# Patient Record
Sex: MALE | Race: BLACK OR AFRICAN AMERICAN | NOT HISPANIC OR LATINO | Employment: UNEMPLOYED | URBAN - METROPOLITAN AREA
[De-identification: names, ages, dates, MRNs, and addresses within clinical notes are randomized per-mention and may not be internally consistent; named-entity substitution may affect disease eponyms.]

---

## 2017-07-10 ENCOUNTER — HOSPITAL ENCOUNTER (OUTPATIENT)
Dept: RADIOLOGY | Facility: HOSPITAL | Age: 32
Discharge: HOME | End: 2017-07-10
Attending: INTERNAL MEDICINE
Payer: MEDICAID

## 2017-07-10 DIAGNOSIS — R10.9 ABDOMINAL PAIN: ICD-10-CM

## 2017-07-10 DIAGNOSIS — R10.9 ABDOMINAL PAIN: Primary | ICD-10-CM

## 2017-07-10 PROCEDURE — 74000 XR ABDOMEN AP 1 VIEW: CPT | Mod: 26,,, | Performed by: RADIOLOGY

## 2017-07-10 PROCEDURE — 74000 XR ABDOMEN AP 1 VIEW: CPT | Mod: TC

## 2017-07-13 ENCOUNTER — LAB VISIT (OUTPATIENT)
Dept: LAB | Facility: HOSPITAL | Age: 32
End: 2017-07-13
Attending: INTERNAL MEDICINE
Payer: MEDICAID

## 2017-07-13 DIAGNOSIS — R10.9 ABDOMINAL PAIN, UNSPECIFIED SITE: Primary | ICD-10-CM

## 2017-07-13 LAB
ALBUMIN SERPL BCP-MCNC: 3.1 G/DL
ALP SERPL-CCNC: 89 U/L
ALT SERPL W/O P-5'-P-CCNC: 8 U/L
ANION GAP SERPL CALC-SCNC: 6 MMOL/L
AST SERPL-CCNC: 13 U/L
BILIRUB SERPL-MCNC: 0.2 MG/DL
BUN SERPL-MCNC: 8 MG/DL
CALCIUM SERPL-MCNC: 9.3 MG/DL
CHLORIDE SERPL-SCNC: 102 MMOL/L
CO2 SERPL-SCNC: 31 MMOL/L
CORTIS SERPL-MCNC: 2.1 UG/DL
CREAT SERPL-MCNC: 0.9 MG/DL
EST. GFR  (AFRICAN AMERICAN): >60 ML/MIN/1.73 M^2
EST. GFR  (NON AFRICAN AMERICAN): >60 ML/MIN/1.73 M^2
GLUCOSE SERPL-MCNC: 89 MG/DL
POTASSIUM SERPL-SCNC: 4.2 MMOL/L
PROT SERPL-MCNC: 8.1 G/DL
SODIUM SERPL-SCNC: 139 MMOL/L

## 2017-07-13 PROCEDURE — 36415 COLL VENOUS BLD VENIPUNCTURE: CPT

## 2017-07-13 PROCEDURE — 82533 TOTAL CORTISOL: CPT

## 2017-07-13 PROCEDURE — 80053 COMPREHEN METABOLIC PANEL: CPT

## 2017-07-14 DIAGNOSIS — R10.9 ABDOMINAL PAIN, UNSPECIFIED SITE: Primary | ICD-10-CM

## 2017-07-26 ENCOUNTER — HOSPITAL ENCOUNTER (OUTPATIENT)
Dept: RADIOLOGY | Facility: HOSPITAL | Age: 32
Discharge: HOME | End: 2017-07-26
Attending: INTERNAL MEDICINE
Payer: MEDICAID

## 2017-07-26 DIAGNOSIS — R10.9 ABDOMINAL PAIN, UNSPECIFIED SITE: ICD-10-CM

## 2017-07-26 PROCEDURE — 74177 CT ABD & PELVIS W/CONTRAST: CPT | Mod: TC

## 2017-07-26 PROCEDURE — 25500020 PHARM REV CODE 255: Performed by: INTERNAL MEDICINE

## 2017-07-26 PROCEDURE — 74177 CT ABD & PELVIS W/CONTRAST: CPT | Mod: 26,,, | Performed by: RADIOLOGY

## 2017-07-26 RX ADMIN — IOHEXOL 100 ML: 350 INJECTION, SOLUTION INTRAVENOUS at 11:07

## 2017-07-26 RX ADMIN — IOHEXOL 15 ML: 300 INJECTION, SOLUTION INTRAVENOUS at 11:07

## 2019-10-23 DIAGNOSIS — R07.89 ANTERIOR CHEST WALL PAIN: ICD-10-CM

## 2019-10-23 DIAGNOSIS — R07.2: Primary | ICD-10-CM

## 2019-10-23 DIAGNOSIS — R07.2 PRECORDIAL PAIN: ICD-10-CM

## 2019-10-24 ENCOUNTER — HOSPITAL ENCOUNTER (OUTPATIENT)
Dept: RADIOLOGY | Facility: HOSPITAL | Age: 34
Discharge: HOME | End: 2019-10-24
Attending: INTERNAL MEDICINE
Payer: MEDICAID

## 2019-10-24 DIAGNOSIS — R07.2 PRECORDIAL PAIN: ICD-10-CM

## 2019-10-24 PROCEDURE — 71120 XR STERNUM PA AND LATERAL: ICD-10-PCS | Mod: 26,,, | Performed by: RADIOLOGY

## 2019-10-24 PROCEDURE — 71120 X-RAY EXAM BREASTBONE 2/>VWS: CPT | Mod: TC,FY

## 2019-10-24 PROCEDURE — 71120 X-RAY EXAM BREASTBONE 2/>VWS: CPT | Mod: 26,,, | Performed by: RADIOLOGY

## 2019-11-26 ENCOUNTER — HOSPITAL ENCOUNTER (OUTPATIENT)
Dept: RADIOLOGY | Facility: HOSPITAL | Age: 34
Discharge: HOME | End: 2019-11-26
Attending: INTERNAL MEDICINE
Payer: MEDICAID

## 2019-11-26 DIAGNOSIS — R07.89 ANTERIOR CHEST WALL PAIN: ICD-10-CM

## 2019-11-26 PROCEDURE — 92610 EVALUATE SWALLOWING FUNCTION: CPT

## 2019-11-26 PROCEDURE — G8996 SWALLOW CURRENT STATUS: HCPCS | Mod: CH

## 2019-11-26 PROCEDURE — 92611 MOTION FLUOROSCOPY/SWALLOW: CPT

## 2019-11-26 PROCEDURE — G8997 SWALLOW GOAL STATUS: HCPCS | Mod: CH

## 2019-11-26 PROCEDURE — 74230 X-RAY XM SWLNG FUNCJ C+: CPT | Mod: TC

## 2019-11-26 PROCEDURE — 74230 X-RAY XM SWLNG FUNCJ C+: CPT | Mod: 26,,, | Performed by: RADIOLOGY

## 2019-11-26 PROCEDURE — G8998 SWALLOW D/C STATUS: HCPCS | Mod: CH

## 2019-11-26 PROCEDURE — 74230 FL MODIFIED BARIUM SWALLOW SPEECH STUDY: ICD-10-PCS | Mod: 26,,, | Performed by: RADIOLOGY

## 2019-11-26 NOTE — PROCEDURES
Modified Barium Swallow    Patient Name:  Darshana Flor   MRN:  [id number]      Recommendations:     Recommendations:                General Recommendations:  Follow-up not indicated  Diet recommendations:   ,   Regular with thin liquids  Aspiration Precautions: none  General Precautions: Standard,    Communication strategies:  none    Referral     Reason for Referral  Patient was referred for a Modified Barium Swallow Study to assess the efficiency of his/her swallow function, rule out aspiration and make recommendations regarding safe dietary consistencies, effective compensatory strategies, and safe eating environment. Pt reporting no issues swallowing, occasional sensation of knot indicating mid sternum.     Diagnosis: dysphagia       History:     No past medical history on file.    Objective:     Current Respiratory Status:  room air    Alert: yes    Cooperative: yes    Follows Directions: yes    Visualization  · Patient was seen in the lateral view    Oral Peripheral Examination  ·  WFL    Consistencies Assessed  · Thin X1 spoon, X3 thin via straw  · Puree X1  · Solids X1    Oral Preparation/Oral Phase  · WFL- Pt with adequate bolus acceptance, containment, control and timely A-P transfer across consistencies     Pharyngeal Phase   WFL, swallow was timely, BOT retraction with resulting epiglottal inversion and laryngeal excursion were adequate for airway protection across trials and consistencies.     Cervical Esophageal Phase  · UES appeared to accommodate all bolus types without stasis or retrograde movement observed     Assessment:     Impressions  ·  Patient with oral and pharyngeal phases of swallowing deemed WNL    Plan  If complaints persist consider barium esophogram vs GI consult.     Education  Results were discussed with patient.      Time Tracking:   SLP Treatment Date:    11/26/19  Speech Start Time:   1040  Speech Stop Time:      1100  Speech Total Time (min):   20 minutes    Nikki Nazario  CCC-SLP  11/26/2019

## 2021-01-14 ENCOUNTER — TELEPHONE (OUTPATIENT)
Dept: RHEUMATOLOGY | Facility: CLINIC | Age: 36
End: 2021-01-14

## 2021-01-28 ENCOUNTER — TELEPHONE (OUTPATIENT)
Dept: RHEUMATOLOGY | Facility: CLINIC | Age: 36
End: 2021-01-28

## 2021-02-23 ENCOUNTER — TELEPHONE (OUTPATIENT)
Dept: RHEUMATOLOGY | Facility: CLINIC | Age: 36
End: 2021-02-23

## 2021-02-23 ENCOUNTER — LAB VISIT (OUTPATIENT)
Dept: LAB | Facility: HOSPITAL | Age: 36
End: 2021-02-23
Attending: INTERNAL MEDICINE
Payer: MEDICAID

## 2021-02-23 ENCOUNTER — OFFICE VISIT (OUTPATIENT)
Dept: RHEUMATOLOGY | Facility: CLINIC | Age: 36
End: 2021-02-23
Payer: MEDICAID

## 2021-02-23 VITALS
DIASTOLIC BLOOD PRESSURE: 77 MMHG | WEIGHT: 199.75 LBS | HEART RATE: 99 BPM | TEMPERATURE: 99 F | SYSTOLIC BLOOD PRESSURE: 131 MMHG

## 2021-02-23 DIAGNOSIS — R53.83 FATIGUE, UNSPECIFIED TYPE: ICD-10-CM

## 2021-02-23 DIAGNOSIS — M79.10 MYALGIA: ICD-10-CM

## 2021-02-23 DIAGNOSIS — R07.9 CHEST PAIN, UNSPECIFIED TYPE: ICD-10-CM

## 2021-02-23 DIAGNOSIS — D86.89 SARCOIDOSIS OF DIGESTIVE SYSTEM: ICD-10-CM

## 2021-02-23 DIAGNOSIS — M54.9 DORSALGIA, UNSPECIFIED: ICD-10-CM

## 2021-02-23 DIAGNOSIS — M25.559 HIP PAIN: ICD-10-CM

## 2021-02-23 DIAGNOSIS — D86.89 SARCOIDOSIS OF DIGESTIVE SYSTEM: Primary | ICD-10-CM

## 2021-02-23 LAB
ALBUMIN SERPL BCP-MCNC: 4 G/DL (ref 3.5–5.2)
ALP SERPL-CCNC: 138 U/L (ref 55–135)
ALT SERPL W/O P-5'-P-CCNC: 10 U/L (ref 10–44)
ANION GAP SERPL CALC-SCNC: 12 MMOL/L (ref 8–16)
AST SERPL-CCNC: 17 U/L (ref 10–40)
BASOPHILS # BLD AUTO: 0.07 K/UL (ref 0–0.2)
BASOPHILS NFR BLD: 0.9 % (ref 0–1.9)
BILIRUB SERPL-MCNC: 0.2 MG/DL (ref 0.1–1)
BUN SERPL-MCNC: 14 MG/DL (ref 6–20)
CALCIUM SERPL-MCNC: 9.9 MG/DL (ref 8.7–10.5)
CCP AB SER IA-ACNC: 1.3 U/ML
CHLORIDE SERPL-SCNC: 98 MMOL/L (ref 95–110)
CK SERPL-CCNC: 66 U/L (ref 20–200)
CO2 SERPL-SCNC: 30 MMOL/L (ref 23–29)
CREAT SERPL-MCNC: 0.9 MG/DL (ref 0.5–1.4)
CRP SERPL-MCNC: 68.4 MG/L (ref 0–8.2)
DIFFERENTIAL METHOD: ABNORMAL
EOSINOPHIL # BLD AUTO: 0.2 K/UL (ref 0–0.5)
EOSINOPHIL NFR BLD: 2.4 % (ref 0–8)
ERYTHROCYTE [DISTWIDTH] IN BLOOD BY AUTOMATED COUNT: 14 % (ref 11.5–14.5)
ERYTHROCYTE [SEDIMENTATION RATE] IN BLOOD BY WESTERGREN METHOD: 68 MM/HR (ref 0–23)
EST. GFR  (AFRICAN AMERICAN): >60 ML/MIN/1.73 M^2
EST. GFR  (NON AFRICAN AMERICAN): >60 ML/MIN/1.73 M^2
GLUCOSE SERPL-MCNC: 87 MG/DL (ref 70–110)
HCT VFR BLD AUTO: 43 % (ref 40–54)
HGB BLD-MCNC: 13.5 G/DL (ref 14–18)
IMM GRANULOCYTES # BLD AUTO: 0.02 K/UL (ref 0–0.04)
IMM GRANULOCYTES NFR BLD AUTO: 0.3 % (ref 0–0.5)
LYMPHOCYTES # BLD AUTO: 3.1 K/UL (ref 1–4.8)
LYMPHOCYTES NFR BLD: 40.9 % (ref 18–48)
MCH RBC QN AUTO: 27.1 PG (ref 27–31)
MCHC RBC AUTO-ENTMCNC: 31.4 G/DL (ref 32–36)
MCV RBC AUTO: 86 FL (ref 82–98)
MONOCYTES # BLD AUTO: 0.7 K/UL (ref 0.3–1)
MONOCYTES NFR BLD: 8.8 % (ref 4–15)
NEUTROPHILS # BLD AUTO: 3.6 K/UL (ref 1.8–7.7)
NEUTROPHILS NFR BLD: 46.7 % (ref 38–73)
NRBC BLD-RTO: 0 /100 WBC
PLATELET # BLD AUTO: 506 K/UL (ref 150–350)
PMV BLD AUTO: 10.7 FL (ref 9.2–12.9)
POTASSIUM SERPL-SCNC: 3.9 MMOL/L (ref 3.5–5.1)
PROT SERPL-MCNC: 8.9 G/DL (ref 6–8.4)
RBC # BLD AUTO: 4.98 M/UL (ref 4.6–6.2)
RHEUMATOID FACT SERPL-ACNC: <10 IU/ML (ref 0–15)
SODIUM SERPL-SCNC: 140 MMOL/L (ref 136–145)
WBC # BLD AUTO: 7.63 K/UL (ref 3.9–12.7)

## 2021-02-23 PROCEDURE — 86706 HEP B SURFACE ANTIBODY: CPT

## 2021-02-23 PROCEDURE — 86431 RHEUMATOID FACTOR QUANT: CPT

## 2021-02-23 PROCEDURE — 80053 COMPREHEN METABOLIC PANEL: CPT

## 2021-02-23 PROCEDURE — 86787 VARICELLA-ZOSTER ANTIBODY: CPT

## 2021-02-23 PROCEDURE — 86235 NUCLEAR ANTIGEN ANTIBODY: CPT

## 2021-02-23 PROCEDURE — 86703 HIV-1/HIV-2 1 RESULT ANTBDY: CPT

## 2021-02-23 PROCEDURE — 82164 ANGIOTENSIN I ENZYME TEST: CPT

## 2021-02-23 PROCEDURE — 85652 RBC SED RATE AUTOMATED: CPT

## 2021-02-23 PROCEDURE — 86140 C-REACTIVE PROTEIN: CPT

## 2021-02-23 PROCEDURE — 86200 CCP ANTIBODY: CPT

## 2021-02-23 PROCEDURE — 99205 PR OFFICE/OUTPT VISIT, NEW, LEVL V, 60-74 MIN: ICD-10-PCS | Mod: S$PBB,,, | Performed by: INTERNAL MEDICINE

## 2021-02-23 PROCEDURE — 87340 HEPATITIS B SURFACE AG IA: CPT

## 2021-02-23 PROCEDURE — 86803 HEPATITIS C AB TEST: CPT

## 2021-02-23 PROCEDURE — 86592 SYPHILIS TEST NON-TREP QUAL: CPT

## 2021-02-23 PROCEDURE — 86038 ANTINUCLEAR ANTIBODIES: CPT

## 2021-02-23 PROCEDURE — 86790 VIRUS ANTIBODY NOS: CPT

## 2021-02-23 PROCEDURE — 36415 COLL VENOUS BLD VENIPUNCTURE: CPT

## 2021-02-23 PROCEDURE — 85025 COMPLETE CBC W/AUTO DIFF WBC: CPT

## 2021-02-23 PROCEDURE — 82085 ASSAY OF ALDOLASE: CPT

## 2021-02-23 PROCEDURE — 99999 PR PBB SHADOW E&M-EST. PATIENT-LVL IV: CPT | Mod: PBBFAC,,, | Performed by: INTERNAL MEDICINE

## 2021-02-23 PROCEDURE — 81374 HLA I TYPING 1 ANTIGEN LR: CPT | Mod: PO

## 2021-02-23 PROCEDURE — 86682 HELMINTH ANTIBODY: CPT

## 2021-02-23 PROCEDURE — 99999 PR PBB SHADOW E&M-EST. PATIENT-LVL IV: ICD-10-PCS | Mod: PBBFAC,,, | Performed by: INTERNAL MEDICINE

## 2021-02-23 PROCEDURE — 82550 ASSAY OF CK (CPK): CPT

## 2021-02-23 PROCEDURE — 99214 OFFICE O/P EST MOD 30 MIN: CPT | Mod: PBBFAC | Performed by: INTERNAL MEDICINE

## 2021-02-23 PROCEDURE — 99205 OFFICE O/P NEW HI 60 MIN: CPT | Mod: S$PBB,,, | Performed by: INTERNAL MEDICINE

## 2021-02-23 PROCEDURE — 86704 HEP B CORE ANTIBODY TOTAL: CPT

## 2021-02-23 RX ORDER — FLUTICASONE PROPIONATE 50 MCG
SPRAY, SUSPENSION (ML) NASAL
COMMUNITY
End: 2024-03-11

## 2021-02-23 RX ORDER — LORATADINE 10 MG/1
TABLET ORAL
COMMUNITY
Start: 2021-01-04 | End: 2023-03-14

## 2021-02-23 RX ORDER — EPINEPHRINE 0.3 MG/.3ML
INJECTION SUBCUTANEOUS
COMMUNITY

## 2021-02-23 RX ORDER — DICYCLOMINE HYDROCHLORIDE 20 MG/1
20 TABLET ORAL 4 TIMES DAILY PRN
COMMUNITY
Start: 2021-01-04 | End: 2023-03-14

## 2021-02-23 RX ORDER — ERGOCALCIFEROL 1.25 MG/1
CAPSULE ORAL
COMMUNITY
End: 2023-03-14

## 2021-02-24 LAB
ALDOLASE SERPL-CCNC: 1.9 U/L (ref 1.2–7.6)
ANA SER QL IF: NORMAL
HBV CORE AB SERPL QL IA: NEGATIVE
HBV SURFACE AB SER-ACNC: POSITIVE M[IU]/ML
HBV SURFACE AG SERPL QL IA: NEGATIVE
HCV AB SERPL QL IA: NEGATIVE
HEPATITIS A ANTIBODY, IGG: NEGATIVE
HIV 1+2 AB+HIV1 P24 AG SERPL QL IA: NEGATIVE
RPR SER QL: NORMAL
VARICELLA INTERPRETATION: POSITIVE
VARICELLA ZOSTER IGG: 3.64 ISR (ref 0–0.9)

## 2021-02-25 ENCOUNTER — TELEPHONE (OUTPATIENT)
Dept: RHEUMATOLOGY | Facility: CLINIC | Age: 36
End: 2021-02-25

## 2021-02-25 LAB — ACE SERPL-CCNC: 19 U/L (ref 16–85)

## 2021-02-26 LAB
ANTI-SSA ANTIBODY: 0.08 RATIO (ref 0–0.99)
ANTI-SSA INTERPRETATION: NEGATIVE
STRONGYLOIDES ANTIBODY IGG: NEGATIVE

## 2021-03-01 ENCOUNTER — HOSPITAL ENCOUNTER (OUTPATIENT)
Dept: RADIOLOGY | Facility: HOSPITAL | Age: 36
Discharge: HOME | End: 2021-03-01
Attending: INTERNAL MEDICINE
Payer: MEDICAID

## 2021-03-01 DIAGNOSIS — R53.83 FATIGUE, UNSPECIFIED TYPE: ICD-10-CM

## 2021-03-01 DIAGNOSIS — M54.9 DORSALGIA, UNSPECIFIED: ICD-10-CM

## 2021-03-01 DIAGNOSIS — M25.559 HIP PAIN: ICD-10-CM

## 2021-03-01 DIAGNOSIS — R07.9 CHEST PAIN, UNSPECIFIED TYPE: ICD-10-CM

## 2021-03-01 DIAGNOSIS — D86.89 SARCOIDOSIS OF DIGESTIVE SYSTEM: ICD-10-CM

## 2021-03-01 DIAGNOSIS — M79.10 MYALGIA: ICD-10-CM

## 2021-03-01 PROCEDURE — 72100 XR LUMBAR SPINE AP AND LATERAL: ICD-10-PCS | Mod: 26,,, | Performed by: RADIOLOGY

## 2021-03-01 PROCEDURE — 72200 XR SACROILIAC JOINTS PA AND OBLIQUE: ICD-10-PCS | Mod: 26,,, | Performed by: RADIOLOGY

## 2021-03-01 PROCEDURE — 72040 X-RAY EXAM NECK SPINE 2-3 VW: CPT | Mod: TC

## 2021-03-01 PROCEDURE — 72040 XR CERVICAL SPINE AP LATERAL: ICD-10-PCS | Mod: 26,,, | Performed by: RADIOLOGY

## 2021-03-01 PROCEDURE — 72100 X-RAY EXAM L-S SPINE 2/3 VWS: CPT | Mod: 26,,, | Performed by: RADIOLOGY

## 2021-03-01 PROCEDURE — 72200 X-RAY EXAM SI JOINTS: CPT | Mod: TC

## 2021-03-01 PROCEDURE — 71046 X-RAY EXAM CHEST 2 VIEWS: CPT | Mod: 26,,, | Performed by: RADIOLOGY

## 2021-03-01 PROCEDURE — 72200 X-RAY EXAM SI JOINTS: CPT | Mod: 26,,, | Performed by: RADIOLOGY

## 2021-03-01 PROCEDURE — 72100 X-RAY EXAM L-S SPINE 2/3 VWS: CPT | Mod: TC

## 2021-03-01 PROCEDURE — 72040 X-RAY EXAM NECK SPINE 2-3 VW: CPT | Mod: 26,,, | Performed by: RADIOLOGY

## 2021-03-01 PROCEDURE — 71046 XR CHEST PA AND LATERAL: ICD-10-PCS | Mod: 26,,, | Performed by: RADIOLOGY

## 2021-03-01 PROCEDURE — 71046 X-RAY EXAM CHEST 2 VIEWS: CPT | Mod: TC

## 2021-03-02 ENCOUNTER — TELEPHONE (OUTPATIENT)
Dept: RHEUMATOLOGY | Facility: CLINIC | Age: 36
End: 2021-03-02

## 2021-03-02 DIAGNOSIS — D84.9 IMMUNOSUPPRESSION: ICD-10-CM

## 2021-03-02 DIAGNOSIS — M45.8 ANKYLOSING SPONDYLITIS OF SACRAL REGION: Primary | ICD-10-CM

## 2021-03-02 LAB
HLA B27 INTERPRETATION: NORMAL
HLA-B27 RELATED AG QL: NORMAL
HLA-B27 RELATED AG QL: POSITIVE

## 2021-03-02 RX ORDER — ADALIMUMAB 40MG/0.4ML
40 KIT SUBCUTANEOUS
Qty: 2 PEN | Refills: 2 | Status: SHIPPED | OUTPATIENT
Start: 2021-03-02 | End: 2021-05-10 | Stop reason: SDUPTHER

## 2021-03-03 ENCOUNTER — TELEPHONE (OUTPATIENT)
Dept: RHEUMATOLOGY | Facility: CLINIC | Age: 36
End: 2021-03-03

## 2021-03-04 ENCOUNTER — SPECIALTY PHARMACY (OUTPATIENT)
Dept: PHARMACY | Facility: CLINIC | Age: 36
End: 2021-03-04

## 2021-03-05 ENCOUNTER — IMMUNIZATION (OUTPATIENT)
Dept: PRIMARY CARE CLINIC | Facility: CLINIC | Age: 36
End: 2021-03-05
Payer: MEDICAID

## 2021-03-05 DIAGNOSIS — Z23 NEED FOR VACCINATION: Primary | ICD-10-CM

## 2021-03-05 PROCEDURE — 0031A PR IMMUNIZ ADMIN, SARS-COV-2 COVID-19 VACC, 5X10VP/0.5ML: ICD-10-PCS | Mod: CV19,S$GLB,, | Performed by: INTERNAL MEDICINE

## 2021-03-05 PROCEDURE — 91303 PR SARSCOV2 VAC AD26 .5ML IM: ICD-10-PCS | Mod: S$GLB,,, | Performed by: INTERNAL MEDICINE

## 2021-03-05 PROCEDURE — 91303 PR SARSCOV2 VAC AD26 .5ML IM: CPT | Mod: S$GLB,,, | Performed by: INTERNAL MEDICINE

## 2021-03-05 PROCEDURE — 0031A PR IMMUNIZ ADMIN, SARS-COV-2 COVID-19 VACC, 5X10VP/0.5ML: CPT | Mod: CV19,S$GLB,, | Performed by: INTERNAL MEDICINE

## 2021-03-09 ENCOUNTER — SPECIALTY PHARMACY (OUTPATIENT)
Dept: PHARMACY | Facility: CLINIC | Age: 36
End: 2021-03-09

## 2021-03-11 ENCOUNTER — CLINICAL SUPPORT (OUTPATIENT)
Dept: REHABILITATION | Facility: HOSPITAL | Age: 36
End: 2021-03-11
Payer: MEDICAID

## 2021-03-11 ENCOUNTER — OFFICE VISIT (OUTPATIENT)
Dept: INFECTIOUS DISEASES | Facility: CLINIC | Age: 36
End: 2021-03-11
Payer: MEDICAID

## 2021-03-11 VITALS
DIASTOLIC BLOOD PRESSURE: 74 MMHG | HEIGHT: 72 IN | BODY MASS INDEX: 26.25 KG/M2 | TEMPERATURE: 98 F | HEART RATE: 67 BPM | WEIGHT: 193.81 LBS | SYSTOLIC BLOOD PRESSURE: 111 MMHG

## 2021-03-11 DIAGNOSIS — M54.50 LUMBAR PAIN: ICD-10-CM

## 2021-03-11 DIAGNOSIS — R29.898 WEAKNESS OF BOTH LOWER EXTREMITIES: ICD-10-CM

## 2021-03-11 DIAGNOSIS — M25.551 HIP PAIN, BILATERAL: ICD-10-CM

## 2021-03-11 DIAGNOSIS — M25.552 HIP PAIN, BILATERAL: ICD-10-CM

## 2021-03-11 DIAGNOSIS — M53.86 DECREASED RANGE OF MOTION OF LUMBAR SPINE: ICD-10-CM

## 2021-03-11 DIAGNOSIS — D84.821 IMMUNOSUPPRESSION DUE TO DRUG THERAPY: Primary | ICD-10-CM

## 2021-03-11 DIAGNOSIS — Z79.899 IMMUNOSUPPRESSION DUE TO DRUG THERAPY: Primary | ICD-10-CM

## 2021-03-11 PROCEDURE — 97110 THERAPEUTIC EXERCISES: CPT | Mod: PO

## 2021-03-11 PROCEDURE — 99999 PR PBB SHADOW E&M-EST. PATIENT-LVL III: ICD-10-PCS | Mod: PBBFAC,,, | Performed by: INTERNAL MEDICINE

## 2021-03-11 PROCEDURE — 97161 PT EVAL LOW COMPLEX 20 MIN: CPT | Mod: PO

## 2021-03-11 PROCEDURE — 97140 MANUAL THERAPY 1/> REGIONS: CPT | Mod: PO

## 2021-03-11 PROCEDURE — 99213 OFFICE O/P EST LOW 20 MIN: CPT | Mod: PBBFAC | Performed by: INTERNAL MEDICINE

## 2021-03-11 PROCEDURE — 99204 PR OFFICE/OUTPT VISIT, NEW, LEVL IV, 45-59 MIN: ICD-10-PCS | Mod: S$PBB,,, | Performed by: INTERNAL MEDICINE

## 2021-03-11 PROCEDURE — 99204 OFFICE O/P NEW MOD 45 MIN: CPT | Mod: S$PBB,,, | Performed by: INTERNAL MEDICINE

## 2021-03-11 PROCEDURE — 99999 PR PBB SHADOW E&M-EST. PATIENT-LVL III: CPT | Mod: PBBFAC,,, | Performed by: INTERNAL MEDICINE

## 2021-03-11 RX ORDER — ACETAMINOPHEN AND CODEINE PHOSPHATE 300; 30 MG/1; MG/1
1 TABLET ORAL EVERY 6 HOURS PRN
COMMUNITY
Start: 2021-03-04 | End: 2023-03-14

## 2021-03-12 PROBLEM — M54.50 LUMBAR PAIN: Status: ACTIVE | Noted: 2021-03-12

## 2021-03-12 PROBLEM — R29.898 LOWER EXTREMITY WEAKNESS: Status: ACTIVE | Noted: 2021-03-12

## 2021-03-12 PROBLEM — M25.552 HIP PAIN, BILATERAL: Status: ACTIVE | Noted: 2021-03-12

## 2021-03-12 PROBLEM — M25.551 HIP PAIN, BILATERAL: Status: ACTIVE | Noted: 2021-03-12

## 2021-03-12 PROBLEM — M53.86 DECREASED RANGE OF MOTION OF LUMBAR SPINE: Status: ACTIVE | Noted: 2021-03-12

## 2021-03-14 ENCOUNTER — PATIENT MESSAGE (OUTPATIENT)
Dept: RHEUMATOLOGY | Facility: CLINIC | Age: 36
End: 2021-03-14

## 2021-03-17 ENCOUNTER — TELEPHONE (OUTPATIENT)
Dept: RHEUMATOLOGY | Facility: CLINIC | Age: 36
End: 2021-03-17

## 2021-03-26 ENCOUNTER — CLINICAL SUPPORT (OUTPATIENT)
Dept: INFECTIOUS DISEASES | Facility: CLINIC | Age: 36
End: 2021-03-26
Payer: MEDICAID

## 2021-03-26 DIAGNOSIS — Z79.899 IMMUNOSUPPRESSION DUE TO DRUG THERAPY: ICD-10-CM

## 2021-03-26 DIAGNOSIS — D84.821 IMMUNOSUPPRESSION DUE TO DRUG THERAPY: ICD-10-CM

## 2021-03-26 PROCEDURE — 90670 PCV13 VACCINE IM: CPT | Mod: PBBFAC

## 2021-03-26 PROCEDURE — 90471 IMMUNIZATION ADMIN: CPT | Mod: PBBFAC

## 2021-04-03 ENCOUNTER — SPECIALTY PHARMACY (OUTPATIENT)
Dept: PHARMACY | Facility: CLINIC | Age: 36
End: 2021-04-03

## 2021-04-12 ENCOUNTER — CLINICAL SUPPORT (OUTPATIENT)
Dept: REHABILITATION | Facility: HOSPITAL | Age: 36
End: 2021-04-12
Payer: MEDICAID

## 2021-04-12 DIAGNOSIS — M25.552 HIP PAIN, BILATERAL: ICD-10-CM

## 2021-04-12 DIAGNOSIS — R29.898 WEAKNESS OF BOTH LOWER EXTREMITIES: ICD-10-CM

## 2021-04-12 DIAGNOSIS — M54.50 LUMBAR PAIN: ICD-10-CM

## 2021-04-12 DIAGNOSIS — M25.551 HIP PAIN, BILATERAL: ICD-10-CM

## 2021-04-12 DIAGNOSIS — M53.86 DECREASED RANGE OF MOTION OF LUMBAR SPINE: ICD-10-CM

## 2021-04-12 PROCEDURE — 97110 THERAPEUTIC EXERCISES: CPT | Mod: PO

## 2021-04-12 PROCEDURE — 97140 MANUAL THERAPY 1/> REGIONS: CPT | Mod: PO

## 2021-04-19 ENCOUNTER — CLINICAL SUPPORT (OUTPATIENT)
Dept: REHABILITATION | Facility: HOSPITAL | Age: 36
End: 2021-04-19
Payer: MEDICAID

## 2021-04-19 DIAGNOSIS — M53.86 DECREASED RANGE OF MOTION OF LUMBAR SPINE: ICD-10-CM

## 2021-04-19 DIAGNOSIS — M25.552 HIP PAIN, BILATERAL: ICD-10-CM

## 2021-04-19 DIAGNOSIS — R29.898 WEAKNESS OF BOTH LOWER EXTREMITIES: ICD-10-CM

## 2021-04-19 DIAGNOSIS — M25.551 HIP PAIN, BILATERAL: ICD-10-CM

## 2021-04-19 DIAGNOSIS — M54.50 LUMBAR PAIN: ICD-10-CM

## 2021-04-19 PROCEDURE — 97140 MANUAL THERAPY 1/> REGIONS: CPT | Mod: PO

## 2021-04-19 PROCEDURE — 97110 THERAPEUTIC EXERCISES: CPT | Mod: PO

## 2021-04-21 ENCOUNTER — CLINICAL SUPPORT (OUTPATIENT)
Dept: REHABILITATION | Facility: HOSPITAL | Age: 36
End: 2021-04-21
Payer: MEDICAID

## 2021-04-21 DIAGNOSIS — R29.898 WEAKNESS OF BOTH LOWER EXTREMITIES: ICD-10-CM

## 2021-04-21 DIAGNOSIS — M25.552 HIP PAIN, BILATERAL: ICD-10-CM

## 2021-04-21 DIAGNOSIS — M53.86 DECREASED RANGE OF MOTION OF LUMBAR SPINE: ICD-10-CM

## 2021-04-21 DIAGNOSIS — M25.551 HIP PAIN, BILATERAL: ICD-10-CM

## 2021-04-21 DIAGNOSIS — M54.50 LUMBAR PAIN: ICD-10-CM

## 2021-04-21 PROCEDURE — 97110 THERAPEUTIC EXERCISES: CPT | Mod: PO

## 2021-04-21 PROCEDURE — 97140 MANUAL THERAPY 1/> REGIONS: CPT | Mod: PO

## 2021-04-26 ENCOUNTER — SPECIALTY PHARMACY (OUTPATIENT)
Dept: PHARMACY | Facility: CLINIC | Age: 36
End: 2021-04-26

## 2021-04-26 ENCOUNTER — CLINICAL SUPPORT (OUTPATIENT)
Dept: REHABILITATION | Facility: HOSPITAL | Age: 36
End: 2021-04-26
Payer: MEDICAID

## 2021-04-26 DIAGNOSIS — M25.552 HIP PAIN, BILATERAL: ICD-10-CM

## 2021-04-26 DIAGNOSIS — R29.898 WEAKNESS OF BOTH LOWER EXTREMITIES: ICD-10-CM

## 2021-04-26 DIAGNOSIS — M53.86 DECREASED RANGE OF MOTION OF LUMBAR SPINE: ICD-10-CM

## 2021-04-26 DIAGNOSIS — M25.551 HIP PAIN, BILATERAL: ICD-10-CM

## 2021-04-26 DIAGNOSIS — M54.50 LUMBAR PAIN: ICD-10-CM

## 2021-04-26 PROCEDURE — 97140 MANUAL THERAPY 1/> REGIONS: CPT | Mod: PO

## 2021-04-26 PROCEDURE — 97110 THERAPEUTIC EXERCISES: CPT | Mod: PO

## 2021-04-28 ENCOUNTER — CLINICAL SUPPORT (OUTPATIENT)
Dept: REHABILITATION | Facility: HOSPITAL | Age: 36
End: 2021-04-28
Payer: MEDICAID

## 2021-04-28 DIAGNOSIS — M54.50 LUMBAR PAIN: ICD-10-CM

## 2021-04-28 DIAGNOSIS — M25.551 HIP PAIN, BILATERAL: ICD-10-CM

## 2021-04-28 DIAGNOSIS — M53.86 DECREASED RANGE OF MOTION OF LUMBAR SPINE: ICD-10-CM

## 2021-04-28 DIAGNOSIS — R29.898 WEAKNESS OF BOTH LOWER EXTREMITIES: ICD-10-CM

## 2021-04-28 DIAGNOSIS — M25.552 HIP PAIN, BILATERAL: ICD-10-CM

## 2021-04-28 PROCEDURE — 97110 THERAPEUTIC EXERCISES: CPT | Mod: PO

## 2021-04-28 PROCEDURE — 97140 MANUAL THERAPY 1/> REGIONS: CPT | Mod: PO

## 2021-05-05 ENCOUNTER — CLINICAL SUPPORT (OUTPATIENT)
Dept: REHABILITATION | Facility: HOSPITAL | Age: 36
End: 2021-05-05
Payer: MEDICAID

## 2021-05-05 DIAGNOSIS — M25.551 HIP PAIN, BILATERAL: ICD-10-CM

## 2021-05-05 DIAGNOSIS — M25.552 HIP PAIN, BILATERAL: ICD-10-CM

## 2021-05-05 DIAGNOSIS — R29.898 WEAKNESS OF BOTH LOWER EXTREMITIES: ICD-10-CM

## 2021-05-05 DIAGNOSIS — M53.86 DECREASED RANGE OF MOTION OF LUMBAR SPINE: ICD-10-CM

## 2021-05-05 DIAGNOSIS — M54.50 LUMBAR PAIN: ICD-10-CM

## 2021-05-05 PROCEDURE — 97110 THERAPEUTIC EXERCISES: CPT | Mod: PO

## 2021-05-05 PROCEDURE — 97140 MANUAL THERAPY 1/> REGIONS: CPT | Mod: PO

## 2021-05-10 ENCOUNTER — OFFICE VISIT (OUTPATIENT)
Dept: RHEUMATOLOGY | Facility: CLINIC | Age: 36
End: 2021-05-10
Payer: MEDICAID

## 2021-05-10 VITALS
HEIGHT: 72 IN | SYSTOLIC BLOOD PRESSURE: 107 MMHG | DIASTOLIC BLOOD PRESSURE: 69 MMHG | WEIGHT: 195.56 LBS | HEART RATE: 87 BPM | BODY MASS INDEX: 26.49 KG/M2

## 2021-05-10 DIAGNOSIS — M45.8 ANKYLOSING SPONDYLITIS OF SACRAL REGION: Primary | ICD-10-CM

## 2021-05-10 DIAGNOSIS — M79.10 MYALGIA: ICD-10-CM

## 2021-05-10 DIAGNOSIS — D84.9 IMMUNOSUPPRESSION: ICD-10-CM

## 2021-05-10 DIAGNOSIS — R53.83 FATIGUE, UNSPECIFIED TYPE: ICD-10-CM

## 2021-05-10 DIAGNOSIS — D86.89 SARCOIDOSIS OF DIGESTIVE SYSTEM: ICD-10-CM

## 2021-05-10 PROCEDURE — 99999 PR PBB SHADOW E&M-EST. PATIENT-LVL III: CPT | Mod: PBBFAC,,, | Performed by: INTERNAL MEDICINE

## 2021-05-10 PROCEDURE — 99214 OFFICE O/P EST MOD 30 MIN: CPT | Mod: S$PBB,,, | Performed by: INTERNAL MEDICINE

## 2021-05-10 PROCEDURE — 99999 PR PBB SHADOW E&M-EST. PATIENT-LVL III: ICD-10-PCS | Mod: PBBFAC,,, | Performed by: INTERNAL MEDICINE

## 2021-05-10 PROCEDURE — 99213 OFFICE O/P EST LOW 20 MIN: CPT | Mod: PBBFAC | Performed by: INTERNAL MEDICINE

## 2021-05-10 PROCEDURE — 99214 PR OFFICE/OUTPT VISIT, EST, LEVL IV, 30-39 MIN: ICD-10-PCS | Mod: S$PBB,,, | Performed by: INTERNAL MEDICINE

## 2021-05-10 RX ORDER — TRIAMCINOLONE ACETONIDE 1 MG/G
CREAM TOPICAL
COMMUNITY
Start: 2021-04-06 | End: 2024-03-11

## 2021-05-10 RX ORDER — IBUPROFEN 100 MG/5ML
1000 SUSPENSION, ORAL (FINAL DOSE FORM) ORAL DAILY
COMMUNITY
End: 2023-03-14

## 2021-05-10 RX ORDER — ADALIMUMAB 40MG/0.4ML
40 KIT SUBCUTANEOUS
Qty: 2 PEN | Refills: 2 | Status: SHIPPED | OUTPATIENT
Start: 2021-05-10 | End: 2021-08-24 | Stop reason: SDUPTHER

## 2021-05-10 ASSESSMENT — ROUTINE ASSESSMENT OF PATIENT INDEX DATA (RAPID3)
PATIENT GLOBAL ASSESSMENT SCORE: 4
PAIN SCORE: 6
FATIGUE SCORE: 5
MDHAQ FUNCTION SCORE: 0.2
WHEN YOU AWAKENED IN THE MORNING OVER THE LAST WEEK, PLEASE INDICATE THE AMOUNT OF TIME IT TAKES UNTIL YOU ARE AS LIMBER AS YOU WILL BE FOR THE DAY: 4 HOURS
TOTAL RAPID3 SCORE: 3.56
AM STIFFNESS SCORE: 1, YES
PSYCHOLOGICAL DISTRESS SCORE: 6.6

## 2021-05-14 ENCOUNTER — PATIENT MESSAGE (OUTPATIENT)
Dept: RHEUMATOLOGY | Facility: CLINIC | Age: 36
End: 2021-05-14

## 2021-05-14 ENCOUNTER — LAB VISIT (OUTPATIENT)
Dept: LAB | Facility: HOSPITAL | Age: 36
End: 2021-05-14
Attending: INTERNAL MEDICINE
Payer: MEDICAID

## 2021-05-14 DIAGNOSIS — M45.8 ANKYLOSING SPONDYLITIS OF SACRAL REGION: ICD-10-CM

## 2021-05-14 DIAGNOSIS — D86.89 SARCOIDOSIS OF DIGESTIVE SYSTEM: ICD-10-CM

## 2021-05-14 DIAGNOSIS — D84.9 IMMUNOSUPPRESSION: ICD-10-CM

## 2021-05-14 DIAGNOSIS — R53.83 FATIGUE, UNSPECIFIED TYPE: ICD-10-CM

## 2021-05-14 DIAGNOSIS — M79.10 MYALGIA: ICD-10-CM

## 2021-05-14 LAB
ALBUMIN SERPL BCP-MCNC: 4.4 G/DL (ref 3.5–5.2)
ALP SERPL-CCNC: 93 U/L (ref 55–135)
ALT SERPL W/O P-5'-P-CCNC: 12 U/L (ref 10–44)
ANION GAP SERPL CALC-SCNC: 7 MMOL/L (ref 8–16)
AST SERPL-CCNC: 17 U/L (ref 10–40)
BASOPHILS # BLD AUTO: 0.08 K/UL (ref 0–0.2)
BASOPHILS NFR BLD: 1.4 % (ref 0–1.9)
BILIRUB SERPL-MCNC: 0.6 MG/DL (ref 0.1–1)
BUN SERPL-MCNC: 6 MG/DL (ref 6–20)
CALCIUM SERPL-MCNC: 9.9 MG/DL (ref 8.7–10.5)
CHLORIDE SERPL-SCNC: 103 MMOL/L (ref 95–110)
CK SERPL-CCNC: 121 U/L (ref 20–200)
CO2 SERPL-SCNC: 31 MMOL/L (ref 23–29)
CREAT SERPL-MCNC: 0.8 MG/DL (ref 0.5–1.4)
CRP SERPL-MCNC: 0.5 MG/L (ref 0–8.2)
DIFFERENTIAL METHOD: ABNORMAL
EOSINOPHIL # BLD AUTO: 0.6 K/UL (ref 0–0.5)
EOSINOPHIL NFR BLD: 9.4 % (ref 0–8)
ERYTHROCYTE [DISTWIDTH] IN BLOOD BY AUTOMATED COUNT: 15.5 % (ref 11.5–14.5)
ERYTHROCYTE [SEDIMENTATION RATE] IN BLOOD BY WESTERGREN METHOD: 14 MM/HR (ref 0–23)
EST. GFR  (AFRICAN AMERICAN): >60 ML/MIN/1.73 M^2
EST. GFR  (NON AFRICAN AMERICAN): >60 ML/MIN/1.73 M^2
GLUCOSE SERPL-MCNC: 74 MG/DL (ref 70–110)
HCT VFR BLD AUTO: 47.3 % (ref 40–54)
HGB BLD-MCNC: 15.6 G/DL (ref 14–18)
IMM GRANULOCYTES # BLD AUTO: 0 K/UL (ref 0–0.04)
IMM GRANULOCYTES NFR BLD AUTO: 0 % (ref 0–0.5)
LYMPHOCYTES # BLD AUTO: 4 K/UL (ref 1–4.8)
LYMPHOCYTES NFR BLD: 69 % (ref 18–48)
MCH RBC QN AUTO: 28.2 PG (ref 27–31)
MCHC RBC AUTO-ENTMCNC: 33 G/DL (ref 32–36)
MCV RBC AUTO: 86 FL (ref 82–98)
MONOCYTES # BLD AUTO: 0.6 K/UL (ref 0.3–1)
MONOCYTES NFR BLD: 9.4 % (ref 4–15)
NEUTROPHILS # BLD AUTO: 0.6 K/UL (ref 1.8–7.7)
NEUTROPHILS NFR BLD: 10.8 % (ref 38–73)
NRBC BLD-RTO: 0 /100 WBC
PLATELET # BLD AUTO: 307 K/UL (ref 150–450)
PMV BLD AUTO: 9.9 FL (ref 9.2–12.9)
POTASSIUM SERPL-SCNC: 3.8 MMOL/L (ref 3.5–5.1)
PROT SERPL-MCNC: 7.9 G/DL (ref 6–8.4)
RBC # BLD AUTO: 5.53 M/UL (ref 4.6–6.2)
SODIUM SERPL-SCNC: 141 MMOL/L (ref 136–145)
WBC # BLD AUTO: 5.83 K/UL (ref 3.9–12.7)

## 2021-05-14 PROCEDURE — 86140 C-REACTIVE PROTEIN: CPT | Performed by: INTERNAL MEDICINE

## 2021-05-14 PROCEDURE — 36415 COLL VENOUS BLD VENIPUNCTURE: CPT | Performed by: INTERNAL MEDICINE

## 2021-05-14 PROCEDURE — 80053 COMPREHEN METABOLIC PANEL: CPT | Performed by: INTERNAL MEDICINE

## 2021-05-14 PROCEDURE — 85652 RBC SED RATE AUTOMATED: CPT | Performed by: INTERNAL MEDICINE

## 2021-05-14 PROCEDURE — 82164 ANGIOTENSIN I ENZYME TEST: CPT | Performed by: INTERNAL MEDICINE

## 2021-05-14 PROCEDURE — 82550 ASSAY OF CK (CPK): CPT | Performed by: INTERNAL MEDICINE

## 2021-05-14 PROCEDURE — 85025 COMPLETE CBC W/AUTO DIFF WBC: CPT | Performed by: INTERNAL MEDICINE

## 2021-05-15 LAB — ACE SERPL-CCNC: 34 U/L (ref 16–85)

## 2021-05-25 ENCOUNTER — SPECIALTY PHARMACY (OUTPATIENT)
Dept: PHARMACY | Facility: CLINIC | Age: 36
End: 2021-05-25

## 2021-06-07 ENCOUNTER — TELEPHONE (OUTPATIENT)
Dept: RHEUMATOLOGY | Facility: CLINIC | Age: 36
End: 2021-06-07

## 2021-06-09 ENCOUNTER — TELEPHONE (OUTPATIENT)
Dept: RHEUMATOLOGY | Facility: CLINIC | Age: 36
End: 2021-06-09

## 2021-06-09 ENCOUNTER — PATIENT MESSAGE (OUTPATIENT)
Dept: RHEUMATOLOGY | Facility: CLINIC | Age: 36
End: 2021-06-09

## 2021-06-25 ENCOUNTER — DOCUMENTATION ONLY (OUTPATIENT)
Dept: REHABILITATION | Facility: HOSPITAL | Age: 36
End: 2021-06-25

## 2021-06-28 ENCOUNTER — SPECIALTY PHARMACY (OUTPATIENT)
Dept: PHARMACY | Facility: CLINIC | Age: 36
End: 2021-06-28

## 2021-07-19 ENCOUNTER — DOCUMENTATION ONLY (OUTPATIENT)
Dept: REHABILITATION | Facility: HOSPITAL | Age: 36
End: 2021-07-19

## 2021-07-21 ENCOUNTER — SPECIALTY PHARMACY (OUTPATIENT)
Dept: PHARMACY | Facility: CLINIC | Age: 36
End: 2021-07-21

## 2021-08-18 DIAGNOSIS — M45.8 ANKYLOSING SPONDYLITIS OF SACRAL REGION: ICD-10-CM

## 2021-08-18 RX ORDER — ADALIMUMAB 40MG/0.4ML
40 KIT SUBCUTANEOUS
Qty: 2 PEN | Refills: 2 | Status: CANCELLED | OUTPATIENT
Start: 2021-08-18 | End: 2021-11-10

## 2021-08-19 ENCOUNTER — PATIENT MESSAGE (OUTPATIENT)
Dept: PHARMACY | Facility: CLINIC | Age: 36
End: 2021-08-19

## 2021-08-23 ENCOUNTER — SPECIALTY PHARMACY (OUTPATIENT)
Dept: PHARMACY | Facility: CLINIC | Age: 36
End: 2021-08-23

## 2021-08-24 DIAGNOSIS — D86.89 SARCOIDOSIS OF DIGESTIVE SYSTEM: ICD-10-CM

## 2021-08-24 DIAGNOSIS — D84.9 IMMUNOSUPPRESSION: ICD-10-CM

## 2021-08-24 DIAGNOSIS — M45.8 ANKYLOSING SPONDYLITIS OF SACRAL REGION: Primary | ICD-10-CM

## 2021-08-24 DIAGNOSIS — M79.10 MYALGIA: ICD-10-CM

## 2021-08-24 DIAGNOSIS — R53.83 FATIGUE, UNSPECIFIED TYPE: ICD-10-CM

## 2021-08-25 RX ORDER — ADALIMUMAB 40MG/0.4ML
40 KIT SUBCUTANEOUS
Qty: 2 PEN | Refills: 2 | Status: SHIPPED | OUTPATIENT
Start: 2021-08-25 | End: 2021-09-03 | Stop reason: SDUPTHER

## 2021-08-26 DIAGNOSIS — M45.8 ANKYLOSING SPONDYLITIS OF SACRAL REGION: ICD-10-CM

## 2021-08-26 RX ORDER — ADALIMUMAB 40MG/0.4ML
40 KIT SUBCUTANEOUS
Qty: 2 PEN | Refills: 2 | OUTPATIENT
Start: 2021-08-26 | End: 2021-11-18

## 2021-09-03 ENCOUNTER — TELEPHONE (OUTPATIENT)
Dept: RHEUMATOLOGY | Facility: CLINIC | Age: 36
End: 2021-09-03

## 2021-09-03 DIAGNOSIS — M45.8 ANKYLOSING SPONDYLITIS OF SACRAL REGION: ICD-10-CM

## 2021-09-03 DIAGNOSIS — D84.9 IMMUNOSUPPRESSION: ICD-10-CM

## 2021-09-03 DIAGNOSIS — R53.83 FATIGUE, UNSPECIFIED TYPE: ICD-10-CM

## 2021-09-03 DIAGNOSIS — D86.89 SARCOIDOSIS OF DIGESTIVE SYSTEM: ICD-10-CM

## 2021-09-03 DIAGNOSIS — M79.10 MYALGIA: ICD-10-CM

## 2021-09-03 RX ORDER — ADALIMUMAB 40MG/0.4ML
40 KIT SUBCUTANEOUS
Qty: 2 PEN | Refills: 2 | Status: SHIPPED | OUTPATIENT
Start: 2021-09-03 | End: 2021-12-10 | Stop reason: SDUPTHER

## 2021-09-08 ENCOUNTER — PATIENT MESSAGE (OUTPATIENT)
Dept: PHARMACY | Facility: CLINIC | Age: 36
End: 2021-09-08

## 2021-09-30 ENCOUNTER — LAB VISIT (OUTPATIENT)
Dept: LAB | Facility: HOSPITAL | Age: 36
End: 2021-09-30
Attending: INTERNAL MEDICINE
Payer: MEDICAID

## 2021-09-30 ENCOUNTER — PATIENT MESSAGE (OUTPATIENT)
Dept: RHEUMATOLOGY | Facility: CLINIC | Age: 36
End: 2021-09-30

## 2021-09-30 ENCOUNTER — OFFICE VISIT (OUTPATIENT)
Dept: RHEUMATOLOGY | Facility: CLINIC | Age: 36
End: 2021-09-30
Payer: MEDICAID

## 2021-09-30 ENCOUNTER — TELEPHONE (OUTPATIENT)
Dept: RHEUMATOLOGY | Facility: CLINIC | Age: 36
End: 2021-09-30

## 2021-09-30 VITALS
BODY MASS INDEX: 28.07 KG/M2 | WEIGHT: 207.25 LBS | DIASTOLIC BLOOD PRESSURE: 71 MMHG | HEIGHT: 72 IN | SYSTOLIC BLOOD PRESSURE: 110 MMHG | HEART RATE: 64 BPM

## 2021-09-30 DIAGNOSIS — R53.83 FATIGUE, UNSPECIFIED TYPE: ICD-10-CM

## 2021-09-30 DIAGNOSIS — D84.9 IMMUNOSUPPRESSION: ICD-10-CM

## 2021-09-30 DIAGNOSIS — M45.8 ANKYLOSING SPONDYLITIS OF SACRAL REGION: Primary | ICD-10-CM

## 2021-09-30 DIAGNOSIS — M45.8 ANKYLOSING SPONDYLITIS OF SACRAL REGION: ICD-10-CM

## 2021-09-30 LAB
ALBUMIN SERPL BCP-MCNC: 4.3 G/DL (ref 3.5–5.2)
ALP SERPL-CCNC: 73 U/L (ref 55–135)
ALT SERPL W/O P-5'-P-CCNC: 21 U/L (ref 10–44)
ANION GAP SERPL CALC-SCNC: 8 MMOL/L (ref 8–16)
AST SERPL-CCNC: 20 U/L (ref 10–40)
BASOPHILS # BLD AUTO: 0.05 K/UL (ref 0–0.2)
BASOPHILS NFR BLD: 0.8 % (ref 0–1.9)
BILIRUB SERPL-MCNC: 0.5 MG/DL (ref 0.1–1)
BUN SERPL-MCNC: 10 MG/DL (ref 6–20)
CALCIUM SERPL-MCNC: 9.9 MG/DL (ref 8.7–10.5)
CHLORIDE SERPL-SCNC: 99 MMOL/L (ref 95–110)
CO2 SERPL-SCNC: 29 MMOL/L (ref 23–29)
CREAT SERPL-MCNC: 0.8 MG/DL (ref 0.5–1.4)
CRP SERPL-MCNC: 0.6 MG/L (ref 0–8.2)
DIFFERENTIAL METHOD: ABNORMAL
EOSINOPHIL # BLD AUTO: 0.4 K/UL (ref 0–0.5)
EOSINOPHIL NFR BLD: 7.3 % (ref 0–8)
ERYTHROCYTE [DISTWIDTH] IN BLOOD BY AUTOMATED COUNT: 14 % (ref 11.5–14.5)
ERYTHROCYTE [SEDIMENTATION RATE] IN BLOOD BY WESTERGREN METHOD: 4 MM/HR (ref 0–23)
EST. GFR  (AFRICAN AMERICAN): >60 ML/MIN/1.73 M^2
EST. GFR  (NON AFRICAN AMERICAN): >60 ML/MIN/1.73 M^2
GLUCOSE SERPL-MCNC: 77 MG/DL (ref 70–110)
HCT VFR BLD AUTO: 44.3 % (ref 40–54)
HGB BLD-MCNC: 14.4 G/DL (ref 14–18)
IMM GRANULOCYTES # BLD AUTO: 0.01 K/UL (ref 0–0.04)
IMM GRANULOCYTES NFR BLD AUTO: 0.2 % (ref 0–0.5)
LYMPHOCYTES # BLD AUTO: 3.4 K/UL (ref 1–4.8)
LYMPHOCYTES NFR BLD: 57 % (ref 18–48)
MCH RBC QN AUTO: 29.6 PG (ref 27–31)
MCHC RBC AUTO-ENTMCNC: 32.5 G/DL (ref 32–36)
MCV RBC AUTO: 91 FL (ref 82–98)
MONOCYTES # BLD AUTO: 0.5 K/UL (ref 0.3–1)
MONOCYTES NFR BLD: 8.7 % (ref 4–15)
NEUTROPHILS # BLD AUTO: 1.5 K/UL (ref 1.8–7.7)
NEUTROPHILS NFR BLD: 26 % (ref 38–73)
NRBC BLD-RTO: 0 /100 WBC
PLATELET # BLD AUTO: 277 K/UL (ref 150–450)
PMV BLD AUTO: 10.1 FL (ref 9.2–12.9)
POTASSIUM SERPL-SCNC: 4.3 MMOL/L (ref 3.5–5.1)
PROT SERPL-MCNC: 7.2 G/DL (ref 6–8.4)
RBC # BLD AUTO: 4.86 M/UL (ref 4.6–6.2)
SODIUM SERPL-SCNC: 136 MMOL/L (ref 136–145)
WBC # BLD AUTO: 5.89 K/UL (ref 3.9–12.7)

## 2021-09-30 PROCEDURE — 99999 PR PBB SHADOW E&M-EST. PATIENT-LVL III: CPT | Mod: PBBFAC,,, | Performed by: INTERNAL MEDICINE

## 2021-09-30 PROCEDURE — 36415 COLL VENOUS BLD VENIPUNCTURE: CPT | Performed by: INTERNAL MEDICINE

## 2021-09-30 PROCEDURE — 99214 OFFICE O/P EST MOD 30 MIN: CPT | Mod: S$PBB,,, | Performed by: INTERNAL MEDICINE

## 2021-09-30 PROCEDURE — 86140 C-REACTIVE PROTEIN: CPT | Performed by: INTERNAL MEDICINE

## 2021-09-30 PROCEDURE — 85652 RBC SED RATE AUTOMATED: CPT | Performed by: INTERNAL MEDICINE

## 2021-09-30 PROCEDURE — 85025 COMPLETE CBC W/AUTO DIFF WBC: CPT | Performed by: INTERNAL MEDICINE

## 2021-09-30 PROCEDURE — 80053 COMPREHEN METABOLIC PANEL: CPT | Performed by: INTERNAL MEDICINE

## 2021-09-30 PROCEDURE — 99214 PR OFFICE/OUTPT VISIT, EST, LEVL IV, 30-39 MIN: ICD-10-PCS | Mod: S$PBB,,, | Performed by: INTERNAL MEDICINE

## 2021-09-30 PROCEDURE — 99213 OFFICE O/P EST LOW 20 MIN: CPT | Mod: PBBFAC | Performed by: INTERNAL MEDICINE

## 2021-09-30 PROCEDURE — 99999 PR PBB SHADOW E&M-EST. PATIENT-LVL III: ICD-10-PCS | Mod: PBBFAC,,, | Performed by: INTERNAL MEDICINE

## 2021-09-30 RX ORDER — TERBINAFINE HYDROCHLORIDE 250 MG/1
250 TABLET ORAL DAILY
COMMUNITY
Start: 2021-06-30 | End: 2023-03-14

## 2021-09-30 RX ORDER — LEVOCETIRIZINE DIHYDROCHLORIDE 5 MG/1
TABLET, FILM COATED ORAL
COMMUNITY
Start: 2021-07-26 | End: 2023-03-14

## 2021-09-30 ASSESSMENT — ROUTINE ASSESSMENT OF PATIENT INDEX DATA (RAPID3)
PAIN SCORE: 10
TOTAL RAPID3 SCORE: 3.33
FATIGUE SCORE: 5
AM STIFFNESS SCORE: 1, YES
PSYCHOLOGICAL DISTRESS SCORE: 0
MDHAQ FUNCTION SCORE: 0
PATIENT GLOBAL ASSESSMENT SCORE: 0

## 2021-10-19 ENCOUNTER — SPECIALTY PHARMACY (OUTPATIENT)
Dept: PHARMACY | Facility: CLINIC | Age: 36
End: 2021-10-19

## 2021-11-11 ENCOUNTER — HOSPITAL ENCOUNTER (EMERGENCY)
Facility: HOSPITAL | Age: 36
Discharge: HOME | End: 2021-11-11
Attending: EMERGENCY MEDICINE
Payer: MEDICAID

## 2021-11-11 VITALS
TEMPERATURE: 98 F | OXYGEN SATURATION: 100 % | BODY MASS INDEX: 25.73 KG/M2 | SYSTOLIC BLOOD PRESSURE: 131 MMHG | WEIGHT: 190 LBS | RESPIRATION RATE: 18 BRPM | HEART RATE: 67 BPM | DIASTOLIC BLOOD PRESSURE: 84 MMHG | HEIGHT: 72 IN

## 2021-11-11 DIAGNOSIS — S61.213A LACERATION OF LEFT MIDDLE FINGER WITHOUT DAMAGE TO NAIL, FOREIGN BODY PRESENCE UNSPECIFIED, INITIAL ENCOUNTER: Primary | ICD-10-CM

## 2021-11-11 PROCEDURE — 63600175 PHARM REV CODE 636 W HCPCS

## 2021-11-11 PROCEDURE — 99284 PR EMERGENCY DEPT VISIT,LEVEL IV: ICD-10-PCS | Mod: ,,, | Performed by: EMERGENCY MEDICINE

## 2021-11-11 PROCEDURE — 96372 THER/PROPH/DIAG INJ SC/IM: CPT

## 2021-11-11 PROCEDURE — 99284 EMERGENCY DEPT VISIT MOD MDM: CPT | Mod: ,,, | Performed by: EMERGENCY MEDICINE

## 2021-11-11 PROCEDURE — 99284 EMERGENCY DEPT VISIT MOD MDM: CPT | Mod: 25

## 2021-11-11 RX ORDER — KETOROLAC TROMETHAMINE 30 MG/ML
15 INJECTION, SOLUTION INTRAMUSCULAR; INTRAVENOUS
Status: COMPLETED | OUTPATIENT
Start: 2021-11-11 | End: 2021-11-11

## 2021-11-11 RX ORDER — CEPHALEXIN 500 MG/1
500 CAPSULE ORAL 4 TIMES DAILY
Qty: 20 CAPSULE | Refills: 0 | Status: SHIPPED | OUTPATIENT
Start: 2021-11-11 | End: 2021-11-16

## 2021-11-11 RX ADMIN — KETOROLAC TROMETHAMINE 15 MG: 30 INJECTION, SOLUTION INTRAMUSCULAR at 07:11

## 2021-11-16 ENCOUNTER — SPECIALTY PHARMACY (OUTPATIENT)
Dept: PHARMACY | Facility: CLINIC | Age: 36
End: 2021-11-16
Payer: MEDICAID

## 2021-12-10 ENCOUNTER — SPECIALTY PHARMACY (OUTPATIENT)
Dept: PHARMACY | Facility: CLINIC | Age: 36
End: 2021-12-10
Payer: MEDICAID

## 2021-12-10 DIAGNOSIS — D84.9 IMMUNOSUPPRESSION: ICD-10-CM

## 2021-12-10 DIAGNOSIS — R53.83 FATIGUE, UNSPECIFIED TYPE: ICD-10-CM

## 2021-12-10 DIAGNOSIS — M79.10 MYALGIA: ICD-10-CM

## 2021-12-10 DIAGNOSIS — D86.89 SARCOIDOSIS OF DIGESTIVE SYSTEM: ICD-10-CM

## 2021-12-10 DIAGNOSIS — M45.8 ANKYLOSING SPONDYLITIS OF SACRAL REGION: ICD-10-CM

## 2021-12-13 RX ORDER — ADALIMUMAB 40MG/0.4ML
40 KIT SUBCUTANEOUS
Qty: 2 PEN | Refills: 2 | Status: SHIPPED | OUTPATIENT
Start: 2021-12-13 | End: 2022-03-03 | Stop reason: SDUPTHER

## 2021-12-15 ENCOUNTER — SPECIALTY PHARMACY (OUTPATIENT)
Dept: PHARMACY | Facility: CLINIC | Age: 36
End: 2021-12-15
Payer: MEDICAID

## 2022-01-06 ENCOUNTER — SPECIALTY PHARMACY (OUTPATIENT)
Dept: PHARMACY | Facility: CLINIC | Age: 37
End: 2022-01-06
Payer: MEDICAID

## 2022-01-07 ENCOUNTER — PATIENT MESSAGE (OUTPATIENT)
Dept: PHARMACY | Facility: CLINIC | Age: 37
End: 2022-01-07
Payer: MEDICAID

## 2022-01-10 NOTE — TELEPHONE ENCOUNTER
Specialty Pharmacy - Refill Coordination    Specialty Medication Orders Linked to Encounter    Flowsheet Row Most Recent Value   Medication #1 adalimumab (HUMIRA,CF, PEN) 40 mg/0.4 mL PnKt (Order#[id number], Rx#[id number])          Refill Questions - Documented Responses    Flowsheet Row Most Recent Value   Patient Availability and HIPAA Verification    Does patient want to proceed with activity? Yes   HIPAA/medical authority confirmed? Yes   Relationship to patient of person spoken to? Self   Refill Screening Questions    Changes to allergies? No   Changes to medications? No   New conditions since last clinic visit? No   Unplanned office visit, urgent care, ED, or hospital admission in the last 4 weeks? No   How does patient/caregiver feel medication is working? Good   Financial problems or insurance changes? No   How many doses of your specialty medications were missed in the last 4 weeks? 0   Would patient like to speak to a pharmacist? No   When does the patient need to receive the medication? 01/13/22   Refill Delivery Questions    How will the patient receive the medication? Pickup   When does the patient need to receive the medication? 01/13/22   Shipping Address Home   Address in [facility] confirmed and updated if neccessary? Yes   Expected Copay ($) 0   Is the patient able to afford the medication copay? Yes   Payment Method zero copay   Days supply of Refill 28   Supplies needed? No supplies needed   Refill activity completed? Yes   Refill activity plan Refill scheduled   Shipment/Pickup Date: 01/10/22          Current Outpatient Medications   Medication Sig    acetaminophen-codeine 300-30mg (TYLENOL #3) 300-30 mg Tab Take 1 tablet by mouth every 6 (six) hours as needed.    adalimumab (HUMIRA,CF, PEN) 40 mg/0.4 mL PnKt Inject 0.4 mLs (40 mg total) into the skin every 14 (fourteen) days.    ascorbic acid, vitamin C, (VITAMIN C) 1000 MG tablet Take 1,000 mg by mouth once daily.    dicyclomine  (BENTYL) 20 mg tablet Take 20 mg by mouth 4 (four) times daily as needed.    EPINEPHrine (EPIPEN) 0.3 mg/0.3 mL AtIn INJ 1 PEN SC PRN UTD    ergocalciferol (ERGOCALCIFEROL) 50,000 unit Cap Take 1 capsule every week by oral route.    fluticasone propionate (FLONASE) 50 mcg/actuation nasal spray SHAKE LQ AND U 1 SPR IEN BID    Lactobacillus rhamnosus GG (CULTURELLE) 10 billion cell capsule Take 1 capsule by mouth once daily.    levocetirizine (XYZAL) 5 MG tablet SMARTSI Tablet(s) By Mouth Every Evening    loratadine (CLARITIN) 10 mg tablet Take 1 tablet every day by oral route for 30 days.    multivitamin with minerals tablet Take 1 tablet by mouth once daily.    terbinafine HCL (LAMISIL) 250 mg tablet Take 250 mg by mouth once daily.    triamcinolone acetonide 0.1% (KENALOG) 0.1 % cream APPLY THIN LAYER TOPICALLY TO THE AFFECTED AREA TWICE DAILY   Last reviewed on 2021  7:09 AM by Shiva Peter RN    Review of patient's allergies indicates:   Allergen Reactions    Tramadol Itching    Celery (apium graveolens) (umbelliferae)     Chicken derived     Corn     Grass pollen- grass standard      TREE POLLEN    BRADEN GRASS     Milk      ALL DAIRY     Peanut      WALNUTS PECANS     Shrimp     Soy     Wheat     Last reviewed on  2021 6:49 AM by Odilia Camacho      Tasks added this encounter   No tasks added.   Tasks due within next 3 months   2022 - Refill Call (Auto Added)     Krish Macias, PharmD  [facility]  [address] [place] [id number]  Phone: [phone]  Fax: [phone]

## 2022-01-13 ENCOUNTER — OFFICE VISIT (OUTPATIENT)
Dept: RHEUMATOLOGY | Facility: CLINIC | Age: 37
End: 2022-01-13
Payer: MEDICAID

## 2022-01-13 ENCOUNTER — LAB VISIT (OUTPATIENT)
Dept: LAB | Facility: HOSPITAL | Age: 37
End: 2022-01-13
Attending: INTERNAL MEDICINE
Payer: MEDICAID

## 2022-01-13 VITALS
BODY MASS INDEX: 27 KG/M2 | WEIGHT: 199.31 LBS | HEIGHT: 72 IN | DIASTOLIC BLOOD PRESSURE: 74 MMHG | SYSTOLIC BLOOD PRESSURE: 121 MMHG | HEART RATE: 73 BPM

## 2022-01-13 DIAGNOSIS — R53.83 FATIGUE, UNSPECIFIED TYPE: ICD-10-CM

## 2022-01-13 DIAGNOSIS — D84.9 IMMUNOSUPPRESSION: ICD-10-CM

## 2022-01-13 DIAGNOSIS — M45.8 ANKYLOSING SPONDYLITIS OF SACRAL REGION: Primary | ICD-10-CM

## 2022-01-13 DIAGNOSIS — D86.89 SARCOIDOSIS OF DIGESTIVE SYSTEM: ICD-10-CM

## 2022-01-13 DIAGNOSIS — M45.8 ANKYLOSING SPONDYLITIS OF SACRAL REGION: ICD-10-CM

## 2022-01-13 LAB
ALBUMIN SERPL BCP-MCNC: 4.5 G/DL (ref 3.5–5.2)
ALP SERPL-CCNC: 76 U/L (ref 55–135)
ALT SERPL W/O P-5'-P-CCNC: 17 U/L (ref 10–44)
ANION GAP SERPL CALC-SCNC: 9 MMOL/L (ref 8–16)
AST SERPL-CCNC: 20 U/L (ref 10–40)
BASOPHILS # BLD AUTO: 0.07 K/UL (ref 0–0.2)
BASOPHILS NFR BLD: 1.3 % (ref 0–1.9)
BILIRUB SERPL-MCNC: 0.8 MG/DL (ref 0.1–1)
BUN SERPL-MCNC: 9 MG/DL (ref 6–20)
CALCIUM SERPL-MCNC: 9.8 MG/DL (ref 8.7–10.5)
CHLORIDE SERPL-SCNC: 103 MMOL/L (ref 95–110)
CO2 SERPL-SCNC: 28 MMOL/L (ref 23–29)
CREAT SERPL-MCNC: 0.7 MG/DL (ref 0.5–1.4)
CRP SERPL-MCNC: 0.6 MG/L (ref 0–8.2)
DIFFERENTIAL METHOD: ABNORMAL
EOSINOPHIL # BLD AUTO: 0.5 K/UL (ref 0–0.5)
EOSINOPHIL NFR BLD: 9.7 % (ref 0–8)
ERYTHROCYTE [DISTWIDTH] IN BLOOD BY AUTOMATED COUNT: 14.1 % (ref 11.5–14.5)
ERYTHROCYTE [SEDIMENTATION RATE] IN BLOOD BY WESTERGREN METHOD: 7 MM/HR (ref 0–23)
EST. GFR  (AFRICAN AMERICAN): >60 ML/MIN/1.73 M^2
EST. GFR  (NON AFRICAN AMERICAN): >60 ML/MIN/1.73 M^2
GLUCOSE SERPL-MCNC: 84 MG/DL (ref 70–110)
HBV CORE AB SERPL QL IA: NEGATIVE
HBV SURFACE AB SER-ACNC: POSITIVE M[IU]/ML
HBV SURFACE AG SERPL QL IA: NEGATIVE
HCT VFR BLD AUTO: 46.4 % (ref 40–54)
HCV AB SERPL QL IA: NEGATIVE
HGB BLD-MCNC: 15 G/DL (ref 14–18)
IMM GRANULOCYTES # BLD AUTO: 0.01 K/UL (ref 0–0.04)
IMM GRANULOCYTES NFR BLD AUTO: 0.2 % (ref 0–0.5)
LYMPHOCYTES # BLD AUTO: 3.2 K/UL (ref 1–4.8)
LYMPHOCYTES NFR BLD: 58 % (ref 18–48)
MCH RBC QN AUTO: 29.3 PG (ref 27–31)
MCHC RBC AUTO-ENTMCNC: 32.3 G/DL (ref 32–36)
MCV RBC AUTO: 91 FL (ref 82–98)
MONOCYTES # BLD AUTO: 0.5 K/UL (ref 0.3–1)
MONOCYTES NFR BLD: 9.9 % (ref 4–15)
NEUTROPHILS # BLD AUTO: 1.2 K/UL (ref 1.8–7.7)
NEUTROPHILS NFR BLD: 20.9 % (ref 38–73)
NRBC BLD-RTO: 0 /100 WBC
PLATELET # BLD AUTO: 333 K/UL (ref 150–450)
PMV BLD AUTO: 10.7 FL (ref 9.2–12.9)
POTASSIUM SERPL-SCNC: 4.5 MMOL/L (ref 3.5–5.1)
PROT SERPL-MCNC: 7.4 G/DL (ref 6–8.4)
RBC # BLD AUTO: 5.12 M/UL (ref 4.6–6.2)
SODIUM SERPL-SCNC: 140 MMOL/L (ref 136–145)
WBC # BLD AUTO: 5.48 K/UL (ref 3.9–12.7)

## 2022-01-13 PROCEDURE — 99214 OFFICE O/P EST MOD 30 MIN: CPT | Mod: S$PBB,,, | Performed by: INTERNAL MEDICINE

## 2022-01-13 PROCEDURE — 1160F PR REVIEW ALL MEDS BY PRESCRIBER/CLIN PHARMACIST DOCUMENTED: ICD-10-PCS | Mod: CPTII,,, | Performed by: INTERNAL MEDICINE

## 2022-01-13 PROCEDURE — 86140 C-REACTIVE PROTEIN: CPT | Performed by: INTERNAL MEDICINE

## 2022-01-13 PROCEDURE — 86704 HEP B CORE ANTIBODY TOTAL: CPT | Performed by: INTERNAL MEDICINE

## 2022-01-13 PROCEDURE — 99999 PR PBB SHADOW E&M-EST. PATIENT-LVL III: CPT | Mod: PBBFAC,,, | Performed by: INTERNAL MEDICINE

## 2022-01-13 PROCEDURE — 3078F PR MOST RECENT DIASTOLIC BLOOD PRESSURE < 80 MM HG: ICD-10-PCS | Mod: CPTII,,, | Performed by: INTERNAL MEDICINE

## 2022-01-13 PROCEDURE — 85025 COMPLETE CBC W/AUTO DIFF WBC: CPT | Performed by: INTERNAL MEDICINE

## 2022-01-13 PROCEDURE — 1159F PR MEDICATION LIST DOCUMENTED IN MEDICAL RECORD: ICD-10-PCS | Mod: CPTII,,, | Performed by: INTERNAL MEDICINE

## 2022-01-13 PROCEDURE — 99999 PR PBB SHADOW E&M-EST. PATIENT-LVL III: ICD-10-PCS | Mod: PBBFAC,,, | Performed by: INTERNAL MEDICINE

## 2022-01-13 PROCEDURE — 80053 COMPREHEN METABOLIC PANEL: CPT | Performed by: INTERNAL MEDICINE

## 2022-01-13 PROCEDURE — 36415 COLL VENOUS BLD VENIPUNCTURE: CPT | Performed by: INTERNAL MEDICINE

## 2022-01-13 PROCEDURE — 3078F DIAST BP <80 MM HG: CPT | Mod: CPTII,,, | Performed by: INTERNAL MEDICINE

## 2022-01-13 PROCEDURE — 85652 RBC SED RATE AUTOMATED: CPT | Performed by: INTERNAL MEDICINE

## 2022-01-13 PROCEDURE — 87340 HEPATITIS B SURFACE AG IA: CPT | Performed by: INTERNAL MEDICINE

## 2022-01-13 PROCEDURE — 3074F SYST BP LT 130 MM HG: CPT | Mod: CPTII,,, | Performed by: INTERNAL MEDICINE

## 2022-01-13 PROCEDURE — 1159F MED LIST DOCD IN RCRD: CPT | Mod: CPTII,,, | Performed by: INTERNAL MEDICINE

## 2022-01-13 PROCEDURE — 86803 HEPATITIS C AB TEST: CPT | Performed by: INTERNAL MEDICINE

## 2022-01-13 PROCEDURE — 99213 OFFICE O/P EST LOW 20 MIN: CPT | Mod: PBBFAC | Performed by: INTERNAL MEDICINE

## 2022-01-13 PROCEDURE — 3074F PR MOST RECENT SYSTOLIC BLOOD PRESSURE < 130 MM HG: ICD-10-PCS | Mod: CPTII,,, | Performed by: INTERNAL MEDICINE

## 2022-01-13 PROCEDURE — 86706 HEP B SURFACE ANTIBODY: CPT | Performed by: INTERNAL MEDICINE

## 2022-01-13 PROCEDURE — 3008F PR BODY MASS INDEX (BMI) DOCUMENTED: ICD-10-PCS | Mod: CPTII,,, | Performed by: INTERNAL MEDICINE

## 2022-01-13 PROCEDURE — 3008F BODY MASS INDEX DOCD: CPT | Mod: CPTII,,, | Performed by: INTERNAL MEDICINE

## 2022-01-13 PROCEDURE — 1160F RVW MEDS BY RX/DR IN RCRD: CPT | Mod: CPTII,,, | Performed by: INTERNAL MEDICINE

## 2022-01-13 PROCEDURE — 99214 PR OFFICE/OUTPT VISIT, EST, LEVL IV, 30-39 MIN: ICD-10-PCS | Mod: S$PBB,,, | Performed by: INTERNAL MEDICINE

## 2022-01-13 PROCEDURE — 82164 ANGIOTENSIN I ENZYME TEST: CPT | Performed by: INTERNAL MEDICINE

## 2022-01-13 RX ORDER — DIPHENHYDRAMINE HCL 25 MG
25 CAPSULE ORAL EVERY 6 HOURS PRN
COMMUNITY
End: 2023-03-14

## 2022-01-13 ASSESSMENT — ROUTINE ASSESSMENT OF PATIENT INDEX DATA (RAPID3)
PATIENT GLOBAL ASSESSMENT SCORE: 0
PAIN SCORE: 0
FATIGUE SCORE: 0
PSYCHOLOGICAL DISTRESS SCORE: 0
TOTAL RAPID3 SCORE: 0
MDHAQ FUNCTION SCORE: 0
AM STIFFNESS SCORE: 0, NO

## 2022-01-13 NOTE — PROGRESS NOTES
"Subjective:       Patient ID: Darshana Flor is a 36 y.o. male.    Chief Complaint: ankylosing spondylitis    HPI:  Darshana Flor is a 36 y.o. male with history of asthma diagnosed with sarcoidosis based on colonic biopsy with noncaseating granulomas.  Consultation for sarcoidosis from primary.  In 2018 had multiple episodes of bloody diarrhea.  Urgent care diagnosed hemorrhoids.  In 2020 evaluated by GI and had 2 colonoscopy.  Colonoscopy biopsy showed non caseating granulomas in the ileum and colon.  Lab showed positive Saccharomyces cerevisae IgG and IgM suggestive of Crohns.    Referred to pulmonary Dr. Shepherd who evaluated patient with x-ray, PFTs and CT chest.  No changes of sarcoid noted but pulmonary suspects CP.  Since 2017 has had a "knot" in his chest that causes intermittent CP worse with leaning back or breathing in.  Improves with steroid from primary.  Denies dysphagia or dynophagia.  Swallowing study normal.   The chronic chest pain felt to be musculoskeletal and not cardiac after cardiology work up.  Pain in muscles and joints shoulders, lower back, hands and hips.  Pain up to 10/10 ache intermittent.  Sudden stiffness in back occurs.   Awakens him from sleep.  Improves with steroids.   Morning stiffness for all day at times.   Pain awakens him from sleep.  Alternating buttock pain.  Exercise helps pain.   Not able to get in car due to pain.    Sudden onset lower extremity weakness that occurs every few month since 20s and he will fall.     Lupus Review of Systems  Alopecia: no  Photosensitivity: no   Raynaud's: no  Oral or nasal ulcers: no  Rashes: no  No pleurisy or pericarditis. (now with pleuritic CP)  No seizures, psychosis, or stroke.  No venous or arterial clots.  Pregnancy hx (if applicable): N/A      INTERVAL HISTORY:    Allergist Dr. Cris Lima at [facility] .    Recommended trying allergy shots and if no improvement then Dupixent.  He needs to schedule a " follow up.     Currently no pain.  Notes no morning stiffness.       Review of Systems   Constitutional: Negative for appetite change, chills and unexpected weight change.   HENT:        Allergies   Respiratory: Negative.  Negative for shortness of breath.    Cardiovascular: Negative.  Negative for chest pain.   Gastrointestinal: Negative.  Negative for abdominal pain, constipation, diarrhea and vomiting.   Endocrine: Negative.    Genitourinary: Negative.    Musculoskeletal: Negative for arthralgias.   Skin: Negative.    Allergic/Immunologic: Positive for environmental allergies.   Neurological: Positive for headaches (sinus HA yesterday; will see allergist).   Hematological: Negative.    Psychiatric/Behavioral: Negative.          Objective:   /74   Pulse 73   Ht 6' (1.829 m)   Wt 90.4 kg (199 lb 4.7 oz)   BMI 27.03 kg/m²      Physical Exam   Constitutional: He is oriented to person, place, and time.   HENT:   Head: Normocephalic and atraumatic.   Eyes: Conjunctivae are normal.   Cardiovascular: Normal rate and regular rhythm.   Pulmonary/Chest: Effort normal and breath sounds normal.   Abdominal: Soft. Bowel sounds are normal.   Musculoskeletal:      Cervical back: Normal range of motion and neck supple.      Comments: Schober 3 cm  Chest expansion 4 cm  No pain bilateral SI joints  Occiput to wall- able to touch occiput to wall     Neurological: He is alert and oriented to person, place, and time. Gait normal.   Skin: Skin is warm and dry.   Psychiatric: Mood and affect normal.     LABS      Component      Latest Ref Rng & Units 9/30/2021   WBC      3.90 - 12.70 K/uL 5.89   RBC      4.60 - 6.20 M/uL 4.86   Hemoglobin      14.0 - 18.0 g/dL 14.4   Hematocrit      40.0 - 54.0 % 44.3   MCV      82 - 98 fL 91   MCH      27.0 - 31.0 pg 29.6   MCHC      32.0 - 36.0 g/dL 32.5   RDW      11.5 - 14.5 % 14.0   Platelets      150 - 450 K/uL 277   MPV      9.2 - 12.9 fL 10.1   Immature Granulocytes      0.0 - 0.5 %  0.2   Gran # (ANC)      1.8 - 7.7 K/uL 1.5 (L)   Immature Grans (Abs)      0.00 - 0.04 K/uL 0.01   Lymph #      1.0 - 4.8 K/uL 3.4   Mono #      0.3 - 1.0 K/uL 0.5   Eos #      0.0 - 0.5 K/uL 0.4   Baso #      0.00 - 0.20 K/uL 0.05   nRBC      0 /100 WBC 0   Gran %      38.0 - 73.0 % 26.0 (L)   Lymph %      18.0 - 48.0 % 57.0 (H)   Mono %      4.0 - 15.0 % 8.7   Eosinophil %      0.0 - 8.0 % 7.3   Basophil %      0.0 - 1.9 % 0.8   Differential Method       Automated   Sodium      136 - 145 mmol/L 136   Potassium      3.5 - 5.1 mmol/L 4.3   Chloride      95 - 110 mmol/L 99   CO2      23 - 29 mmol/L 29   Glucose      70 - 110 mg/dL 77   BUN      6 - 20 mg/dL 10   Creatinine      0.5 - 1.4 mg/dL 0.8   Calcium      8.7 - 10.5 mg/dL 9.9   PROTEIN TOTAL      6.0 - 8.4 g/dL 7.2   Albumin      3.5 - 5.2 g/dL 4.3   BILIRUBIN TOTAL      0.1 - 1.0 mg/dL 0.5   Alkaline Phosphatase      55 - 135 U/L 73   AST      10 - 40 U/L 20   ALT      10 - 44 U/L 21   Anion Gap      8 - 16 mmol/L 8   eGFR if African American      >60 mL/min/1.73 m:2 >60.0   eGFR if non African American      >60 mL/min/1.73 m:2 >60.0   Sed Rate      0 - 23 mm/Hr 4   CRP      0.0 - 8.2 mg/L 0.6     3/20/2021 CT chest normal.   Assessment:       1.  Ankylosing spondylitis.  Musculoskeletal Chest and back pain.  Abnormal occiput to wall, chest wall expansion, Schobers and SI joint pain along with inflammatory back pain.   2.  Sarcoidosis of GI tract.  Will need to be followed  3.  Positive antibody for Crohns  4.  Myalgias  5.  Joint pains  6.  Fatigue  7.  Childhood asthma  8.  Childhood allergies  9.  Rash on neck.  Ointment from primary  10. Left 4th toe nail fungus.  Topical treatment from primary.  11. Recurrent sinusitis    DDX  Ankylosing spondylitis, Crohns, sarcoidosis  Plan:       1.  Continue Humira.  2.  Check labs   3.  Follow with allergy and see if treatment option before Dupixent works  4.  Restart exercises taught in PT.       RTO in 6  months/prn

## 2022-01-14 ENCOUNTER — PATIENT MESSAGE (OUTPATIENT)
Dept: RHEUMATOLOGY | Facility: CLINIC | Age: 37
End: 2022-01-14
Payer: MEDICAID

## 2022-01-14 LAB — ACE SERPL-CCNC: 34 U/L (ref 16–85)

## 2022-02-04 ENCOUNTER — SPECIALTY PHARMACY (OUTPATIENT)
Dept: PHARMACY | Facility: CLINIC | Age: 37
End: 2022-02-04
Payer: MEDICAID

## 2022-02-04 NOTE — TELEPHONE ENCOUNTER
Specialty Pharmacy - Refill Coordination    Specialty Medication Orders Linked to Encounter    Flowsheet Row Most Recent Value   Medication #1 adalimumab (HUMIRA,CF, PEN) 40 mg/0.4 mL PnKt (Order#[id number], Rx#[id number])          Refill Questions - Documented Responses    Flowsheet Row Most Recent Value   Patient Availability and HIPAA Verification    Does patient want to proceed with activity? Yes   HIPAA/medical authority confirmed? Yes   Refill Screening Questions    Changes to allergies? No   Changes to medications? No   New conditions since last clinic visit? No   Unplanned office visit, urgent care, ED, or hospital admission in the last 4 weeks? No   How does patient/caregiver feel medication is working? Good   Financial problems or insurance changes? No   How many doses of your specialty medications were missed in the last 4 weeks? 0   Would patient like to speak to a pharmacist? No   When does the patient need to receive the medication? 02/10/22   Refill Delivery Questions    How will the patient receive the medication? Pickup   When does the patient need to receive the medication? 02/10/22   Expected Copay ($) 0   Is the patient able to afford the medication copay? Yes   Payment Method zero copay   Days supply of Refill 28   Supplies needed? No supplies needed   Refill activity completed? Yes   Refill activity plan Refill scheduled   Shipment/Pickup Date: 02/07/22          Current Outpatient Medications   Medication Sig    acetaminophen-codeine 300-30mg (TYLENOL #3) 300-30 mg Tab Take 1 tablet by mouth every 6 (six) hours as needed.    adalimumab (HUMIRA,CF, PEN) 40 mg/0.4 mL PnKt Inject 0.4 mLs (40 mg total) into the skin every 14 (fourteen) days.    ascorbic acid, vitamin C, (VITAMIN C) 1000 MG tablet Take 1,000 mg by mouth once daily.    dicyclomine (BENTYL) 20 mg tablet Take 20 mg by mouth 4 (four) times daily as needed.    diphenhydrAMINE (BENADRYL) 25 mg capsule Take 25 mg by mouth every 6  (six) hours as needed for Itching.    EPINEPHrine (EPIPEN) 0.3 mg/0.3 mL AtIn INJ 1 PEN SC PRN UTD    ergocalciferol (ERGOCALCIFEROL) 50,000 unit Cap Take 1 capsule every week by oral route.    fluticasone propionate (FLONASE) 50 mcg/actuation nasal spray SHAKE LQ AND U 1 SPR IEN BID    Lactobacillus rhamnosus GG (CULTURELLE) 10 billion cell capsule Take 1 capsule by mouth once daily.    levocetirizine (XYZAL) 5 MG tablet SMARTSI Tablet(s) By Mouth Every Evening    loratadine (CLARITIN) 10 mg tablet Take 1 tablet every day by oral route for 30 days.    multivitamin with minerals tablet Take 1 tablet by mouth once daily.    terbinafine HCL (LAMISIL) 250 mg tablet Take 250 mg by mouth once daily.    triamcinolone acetonide 0.1% (KENALOG) 0.1 % cream APPLY THIN LAYER TOPICALLY TO THE AFFECTED AREA TWICE DAILY   Last reviewed on 2022  9:30 AM by Josefa Pastrana MD    Review of patient's allergies indicates:   Allergen Reactions    Tramadol Itching    Celery (apium graveolens) (umbelliferae)     Chicken derived     Corn     Grass pollen- grass standard      TREE POLLEN    BRADEN GRASS     Milk      ALL DAIRY     Peanut      WALNUTS PECANS     Shrimp     Soy     Wheat     Last reviewed on  2022 9:30 AM by Josefa Pastrana      Tasks added this encounter   3/3/2022 - Refill Call (Auto Added)  2022 - Pickup Reminder   Tasks due within next 3 months   No tasks due.     Anne Sanchez, PharmD  [facility]  [address] [place] [id number]  Phone: [phone]  Fax: [phone]

## 2022-03-03 DIAGNOSIS — D84.9 IMMUNOSUPPRESSION: ICD-10-CM

## 2022-03-03 DIAGNOSIS — M79.10 MYALGIA: ICD-10-CM

## 2022-03-03 DIAGNOSIS — M45.8 ANKYLOSING SPONDYLITIS OF SACRAL REGION: ICD-10-CM

## 2022-03-03 DIAGNOSIS — D86.89 SARCOIDOSIS OF DIGESTIVE SYSTEM: ICD-10-CM

## 2022-03-03 DIAGNOSIS — R53.83 FATIGUE, UNSPECIFIED TYPE: ICD-10-CM

## 2022-03-04 RX ORDER — ADALIMUMAB 40MG/0.4ML
40 KIT SUBCUTANEOUS
Qty: 2 PEN | Refills: 2 | Status: SHIPPED | OUTPATIENT
Start: 2022-03-04 | End: 2022-05-31 | Stop reason: SDUPTHER

## 2022-03-09 ENCOUNTER — SPECIALTY PHARMACY (OUTPATIENT)
Dept: PHARMACY | Facility: CLINIC | Age: 37
End: 2022-03-09
Payer: MEDICAID

## 2022-03-09 NOTE — TELEPHONE ENCOUNTER
Specialty Pharmacy - Refill Coordination    Specialty Medication Orders Linked to Encounter    Flowsheet Row Most Recent Value   Medication #1 adalimumab (HUMIRA,CF, PEN) 40 mg/0.4 mL PnKt (Order#[id number], Rx#)          Refill Questions - Documented Responses    Flowsheet Row Most Recent Value   Patient Availability and HIPAA Verification    Does patient want to proceed with activity? Yes   HIPAA/medical authority confirmed? Yes   Relationship to patient of person spoken to? Self   Refill Screening Questions    Changes to allergies? No   Changes to medications? No   New conditions since last clinic visit? No   Unplanned office visit, urgent care, ED, or hospital admission in the last 4 weeks? No   How does patient/caregiver feel medication is working? Good   Financial problems or insurance changes? No   How many doses of your specialty medications were missed in the last 4 weeks? 0   Would patient like to speak to a pharmacist? No   When does the patient need to receive the medication? 03/10/22   Refill Delivery Questions    How will the patient receive the medication? Pickup   When does the patient need to receive the medication? 03/10/22   Shipping Address Home   Address in [facility] confirmed and updated if neccessary? Yes   Expected Copay ($) 0   Is the patient able to afford the medication copay? Yes   Payment Method zero copay   Days supply of Refill 28   Supplies needed? No supplies needed   Refill activity completed? Yes   Refill activity plan Refill scheduled   Shipment/Pickup Date: 03/10/22          Current Outpatient Medications   Medication Sig    acetaminophen-codeine 300-30mg (TYLENOL #3) 300-30 mg Tab Take 1 tablet by mouth every 6 (six) hours as needed.    adalimumab (HUMIRA,CF, PEN) 40 mg/0.4 mL PnKt Inject 0.4 mLs (40 mg total) into the skin every 14 (fourteen) days.    ascorbic acid, vitamin C, (VITAMIN C) 1000 MG tablet Take 1,000 mg by mouth once daily.    dicyclomine (BENTYL) 20 mg  tablet Take 20 mg by mouth 4 (four) times daily as needed.    diphenhydrAMINE (BENADRYL) 25 mg capsule Take 25 mg by mouth every 6 (six) hours as needed for Itching.    EPINEPHrine (EPIPEN) 0.3 mg/0.3 mL AtIn INJ 1 PEN SC PRN UTD    ergocalciferol (ERGOCALCIFEROL) 50,000 unit Cap Take 1 capsule every week by oral route.    fluticasone propionate (FLONASE) 50 mcg/actuation nasal spray SHAKE LQ AND U 1 SPR IEN BID    Lactobacillus rhamnosus GG (CULTURELLE) 10 billion cell capsule Take 1 capsule by mouth once daily.    levocetirizine (XYZAL) 5 MG tablet SMARTSI Tablet(s) By Mouth Every Evening    loratadine (CLARITIN) 10 mg tablet Take 1 tablet every day by oral route for 30 days.    multivitamin with minerals tablet Take 1 tablet by mouth once daily.    terbinafine HCL (LAMISIL) 250 mg tablet Take 250 mg by mouth once daily.    triamcinolone acetonide 0.1% (KENALOG) 0.1 % cream APPLY THIN LAYER TOPICALLY TO THE AFFECTED AREA TWICE DAILY   Last reviewed on 2022  9:30 AM by Josefa Pastrana MD    Review of patient's allergies indicates:   Allergen Reactions    Tramadol Itching    Celery (apium graveolens) (umbelliferae)     Chicken derived     Corn     Grass pollen- grass standard      TREE POLLEN    BRADEN GRASS     Milk      ALL DAIRY     Peanut      WALNUTS PECANS     Shrimp     Soy     Wheat     Last reviewed on  2022 9:30 AM by Josefa Pastrana      Tasks added this encounter   3/31/2022 - Refill Call (Auto Added)  3/11/2022 - Pickup Reminder   Tasks due within next 3 months   No tasks due.     Missy Olvera [facility]  [address] [place] [id number]  Phone: [phone]  Fax: [phone]

## 2022-04-02 ENCOUNTER — SPECIALTY PHARMACY (OUTPATIENT)
Dept: PHARMACY | Facility: CLINIC | Age: 37
End: 2022-04-02
Payer: MEDICAID

## 2022-04-02 NOTE — TELEPHONE ENCOUNTER
Specialty Pharmacy - Refill Coordination    Specialty Medication Orders Linked to Encounter    Flowsheet Row Most Recent Value   Medication #1 adalimumab (HUMIRA,CF, PEN) 40 mg/0.4 mL PnKt (Order#[id number], Rx#[id number])          Refill Questions - Documented Responses    Flowsheet Row Most Recent Value   Patient Availability and HIPAA Verification    Does patient want to proceed with activity? Yes   HIPAA/medical authority confirmed? Yes   Relationship to patient of person spoken to? Self   Refill Screening Questions    Changes to allergies? No   Changes to medications? No   New conditions since last clinic visit? No   Unplanned office visit, urgent care, ED, or hospital admission in the last 4 weeks? No   How does patient/caregiver feel medication is working? Good   Financial problems or insurance changes? No   How many doses of your specialty medications were missed in the last 4 weeks? 0   Would patient like to speak to a pharmacist? No   When does the patient need to receive the medication? 04/07/22   Refill Delivery Questions    How will the patient receive the medication? Pickup   When does the patient need to receive the medication? 04/07/22   Shipping Address Home   Address in [facility] confirmed and updated if neccessary? Yes   Expected Copay ($) 0   Is the patient able to afford the medication copay? Yes   Payment Method zero copay   Days supply of Refill 28   Supplies needed? No supplies needed   Refill activity completed? Yes   Refill activity plan Refill scheduled   Shipment/Pickup Date: 04/04/22          Current Outpatient Medications   Medication Sig    acetaminophen-codeine 300-30mg (TYLENOL #3) 300-30 mg Tab Take 1 tablet by mouth every 6 (six) hours as needed.    adalimumab (HUMIRA,CF, PEN) 40 mg/0.4 mL PnKt Inject 0.4 mLs (40 mg total) into the skin every 14 (fourteen) days.    ascorbic acid, vitamin C, (VITAMIN C) 1000 MG tablet Take 1,000 mg by mouth once daily.    dicyclomine  (BENTYL) 20 mg tablet Take 20 mg by mouth 4 (four) times daily as needed.    diphenhydrAMINE (BENADRYL) 25 mg capsule Take 25 mg by mouth every 6 (six) hours as needed for Itching.    EPINEPHrine (EPIPEN) 0.3 mg/0.3 mL AtIn INJ 1 PEN SC PRN UTD    ergocalciferol (ERGOCALCIFEROL) 50,000 unit Cap Take 1 capsule every week by oral route.    fluticasone propionate (FLONASE) 50 mcg/actuation nasal spray SHAKE LQ AND U 1 SPR IEN BID    Lactobacillus rhamnosus GG (CULTURELLE) 10 billion cell capsule Take 1 capsule by mouth once daily.    levocetirizine (XYZAL) 5 MG tablet SMARTSI Tablet(s) By Mouth Every Evening    loratadine (CLARITIN) 10 mg tablet Take 1 tablet every day by oral route for 30 days.    multivitamin with minerals tablet Take 1 tablet by mouth once daily.    terbinafine HCL (LAMISIL) 250 mg tablet Take 250 mg by mouth once daily.    triamcinolone acetonide 0.1% (KENALOG) 0.1 % cream APPLY THIN LAYER TOPICALLY TO THE AFFECTED AREA TWICE DAILY   Last reviewed on 2022  9:30 AM by Josefa Pastrana MD    Review of patient's allergies indicates:   Allergen Reactions    Tramadol Itching    Celery (apium graveolens) (umbelliferae)     Chicken derived     Corn     Grass pollen- grass standard      TREE POLLEN    BRADEN GRASS     Milk      ALL DAIRY     Peanut      WALNUTS PECANS     Shrimp     Soy     Wheat     Last reviewed on  2022 9:30 AM by Josefa Pastrana      Tasks added this encounter   2022 - Refill Call (Auto Added)  2022 - Pickup Reminder   Tasks due within next 3 months   No tasks due.     [facility]  [address] [place] [id number]  Phone: [phone]  Fax: [phone]

## 2022-04-18 ENCOUNTER — TELEPHONE (OUTPATIENT)
Dept: RHEUMATOLOGY | Facility: CLINIC | Age: 37
End: 2022-04-18
Payer: MEDICAID

## 2022-04-18 ENCOUNTER — PATIENT MESSAGE (OUTPATIENT)
Dept: ADMINISTRATIVE | Facility: OTHER | Age: 37
End: 2022-04-18
Payer: MEDICAID

## 2022-04-18 NOTE — TELEPHONE ENCOUNTER
----- Message from Maci Cruz sent at 4/18/2022 10:40 AM CDT -----  Regarding: Pt Inquiry  Pt asked to speak to doctor about having a side effect to allergy injection Dupixent.    Can [facility] be contacted on [facility]YES       Requesting Call back number:[phone]

## 2022-04-18 NOTE — TELEPHONE ENCOUNTER
Spoke with the patient and Dr Malik about when the patient should take the humira since he had a reaction to an injection he got outside of [facility]. He is to call the allergist to let her know what is going on and to keep Dr Ramirez updated. To go to the nearest ER if the reaction gets severe.

## 2022-04-19 ENCOUNTER — HOSPITAL ENCOUNTER (EMERGENCY)
Facility: HOSPITAL | Age: 37
Discharge: HOME | End: 2022-04-19
Attending: EMERGENCY MEDICINE
Payer: MEDICAID

## 2022-04-19 VITALS
RESPIRATION RATE: 18 BRPM | DIASTOLIC BLOOD PRESSURE: 78 MMHG | HEIGHT: 72 IN | OXYGEN SATURATION: 100 % | WEIGHT: 190 LBS | BODY MASS INDEX: 25.73 KG/M2 | SYSTOLIC BLOOD PRESSURE: 139 MMHG | TEMPERATURE: 98 F | HEART RATE: 88 BPM

## 2022-04-19 DIAGNOSIS — R20.3 HYPERESTHESIA: ICD-10-CM

## 2022-04-19 DIAGNOSIS — T50.905A ADVERSE REACTION TO DRUG IN THERAPEUTIC USE: Primary | ICD-10-CM

## 2022-04-19 PROCEDURE — 83520 IMMUNOASSAY QUANT NOS NONAB: CPT | Performed by: EMERGENCY MEDICINE

## 2022-04-19 PROCEDURE — 96372 THER/PROPH/DIAG INJ SC/IM: CPT | Performed by: EMERGENCY MEDICINE

## 2022-04-19 PROCEDURE — 99284 EMERGENCY DEPT VISIT MOD MDM: CPT | Mod: ,,, | Performed by: EMERGENCY MEDICINE

## 2022-04-19 PROCEDURE — 63600175 PHARM REV CODE 636 W HCPCS: Performed by: EMERGENCY MEDICINE

## 2022-04-19 PROCEDURE — 99284 PR EMERGENCY DEPT VISIT,LEVEL IV: ICD-10-PCS | Mod: ,,, | Performed by: EMERGENCY MEDICINE

## 2022-04-19 PROCEDURE — 99284 EMERGENCY DEPT VISIT MOD MDM: CPT | Mod: 25

## 2022-04-19 RX ORDER — DROPERIDOL 2.5 MG/ML
1.25 INJECTION, SOLUTION INTRAMUSCULAR; INTRAVENOUS
Status: COMPLETED | OUTPATIENT
Start: 2022-04-19 | End: 2022-04-19

## 2022-04-19 RX ORDER — PREDNISONE 20 MG/1
60 TABLET ORAL
Status: COMPLETED | OUTPATIENT
Start: 2022-04-19 | End: 2022-04-19

## 2022-04-19 RX ADMIN — DROPERIDOL 1.25 MG: 2.5 INJECTION, SOLUTION INTRAMUSCULAR; INTRAVENOUS at 02:04

## 2022-04-19 RX ADMIN — PREDNISONE 60 MG: 20 TABLET ORAL at 01:04

## 2022-04-19 NOTE — Clinical Note
"Darshana"Hugh Flor was seen and treated in our emergency department on 4/19/2022.  He may return to work on 04/20/2022.       If you have any questions or concerns, please don't hesitate to call.      Marina Frye MD"

## 2022-04-19 NOTE — ED PROVIDER NOTES
Encounter Date: 4/19/2022       History     Chief Complaint   Patient presents with    Allergic Reaction     Took dupixent infection on Friday, took benadryl, voice clear, pain with swallowing resp even     HPI     36yM with h/o ankylosing spondylitis, sarcoidosis, ?Crohn's disease and allergies for which he was given a dose of dupixent (dupilumab, IL4 and IL13 blocker) on Friday and is now concerned for acute allergic reaction.  He reports hypersensitivity to his scan, reports even taking a shower was painful to him this morning, his clothes are painful to him.  He is not itchy.  He does not feel like his throat is closing, is not short of breath.  He has an EpiPen but did not take it.  He took a Benadryl.  He called the allergist office today, and they recommended he come to the ER to get a tryptase level.    Review of patient's allergies indicates:   Allergen Reactions    Dupixent pen [dupilumab] Other (See Comments)     The patient claims to have flu like symptoms after injection    Tramadol Itching    Celery (apium graveolens) (umbelliferae)     Chicken derived     Corn     Grass pollen-june grass standard      TREE POLLEN    BRADEN GRASS     Milk      ALL DAIRY     Peanut      WALNUTS PECANS     Shrimp     Soy     Wheat      Past Medical History:   Diagnosis Date    Allergy     Asthma     Hyperlipidemia     IBS (irritable bowel syndrome)     Lactose intolerance     Sinusitis      Past Surgical History:   Procedure Laterality Date    SINUS SURGERY       Family History   Problem Relation Age of Onset    Rheum arthritis Mother     Rheum arthritis Maternal Uncle     Diabetes Mellitus Father     Hypertension Father     Lupus Neg Hx     Inflammatory bowel disease Neg Hx      Social History     Tobacco Use    Smoking status: Never Smoker    Smokeless tobacco: Never Used   Substance Use Topics    Alcohol use: Yes     Comment: occasionally    Drug use: Yes     Frequency: 7.0 times per week      Types: Marijuana     Comment: cbd     Review of Systems   Constitutional: Negative for chills, diaphoresis, fatigue and fever.   HENT: Negative for congestion, rhinorrhea and sore throat.    Eyes: Negative for visual disturbance.   Respiratory: Negative for cough, chest tightness and shortness of breath.    Cardiovascular: Negative for chest pain.   Gastrointestinal: Negative for abdominal pain, blood in stool, constipation, diarrhea and vomiting.   Genitourinary: Negative for dysuria, hematuria and urgency.   Musculoskeletal: Negative for back pain.   Skin: Positive for rash.   Neurological: Negative for seizures and syncope.   Hematological: Does not bruise/bleed easily.   Psychiatric/Behavioral: Negative for agitation and hallucinations.       Physical Exam     Initial Vitals [04/19/22 1243]   BP Pulse Resp Temp SpO2   (!) 141/83 76 18 98.2 °F (36.8 °C) 100 %      MAP       --         Physical Exam   Gen: AxOx3, NAD, well nourished, appears stated age  Eye: EOMI, no scleral icterus, no periorbital edema or ecchymosis  Head: normocephalic, atraumatic, no lesions, scalp appears normal  ENT: neck supple, no stridor, no masses, no drooling or voice changes, OP clear, uvula appears normal.   CVS: RRR, no m/r/g, distal pulses intact/symmetric  Pulm: CTAB, no wheezes, rales or rhonchi, no increased work of breathing  Abd: soft, nontender, nondistended, no organomegaly, no CVAT  Ext: no edema, no lesions, or deformity, there is piloerection over his bilateral forearms which patient reports is the rash, no warmth or induration noted.   Neuro: GCS15, moving all extremities, gait intact, face grossly symmetric  Psych: normal affect, cooperative, well groomed, makes good eye contact    ED Course   Procedures  Labs Reviewed   TRYPTASE          Imaging Results    None          Medications   predniSONE tablet 60 mg (60 mg Oral Given 4/19/22 1321)   droperidoL injection 1.25 mg (1.25 mg Intramuscular Given 4/19/22 1418)      Medical Decision Making:   Initial Assessment:   This 36-year-old man who presents with hyperesthesia after getting a Dupixent injection on Friday. I did call the patient's allergy office and they requested a tryptase level but reported this is not a usual effect of dupixent. His exam is not consistent with anaphylaxis, I do not think he need epinephrine at this time. I do think it could be consistent with adverse reaction or allergy, will give po prednisone and reassess.   Clinical Tests:   Lab Tests: Ordered  ED Management:  Prednisone did not improve his symptoms, so given ha/neuro component I trialed droperidol, which significantly improved his symptoms, now 2/10 on intensity. I do not think he needs Rx for this, will dc with routine outpatient follow up.                       Clinical Impression:   Final diagnoses:  [T50.905A] Adverse reaction to drug in therapeutic use (Primary)  [R20.3] Hyperesthesia          ED Disposition Condition    Discharge Stable        ED Prescriptions     None        Follow-up Information     Follow up With Specialties Details Why Contact Info    Janelle Arvizu MD Internal Medicine, Pediatrics Schedule an appointment as soon as possible for a visit   [address]   [address]  [facility] [address]  [phone]      [facility] - Emergency Dept Emergency Medicine  If symptoms worsen [address] [facility] [address] [phone]           Marina Frye MD  04/19/22 5532

## 2022-04-19 NOTE — ED TRIAGE NOTES
"Darshana Flor, a 36 y.o. male presents to the ED w/ complaint of allergic reaction to dupixent pen. Pt reports he experienced right head pressure/pain and photosensitivity after taking dupixent pen on Friday. Pt reports he took benadryl following reaction. Pt reports pain with swallowing. Denies difficulty with respiration. Pt reports that the "pressure in my head is coming back now." Denies chest pain. Reports nausea and diarrhea. Denies vomiting. Pt reports he last took benadryl yesterday.    Triage note:  Chief Complaint   Patient presents with    Allergic Reaction     Took dupixent infection on Friday, took benadryl, voice clear, pain with swallowing resp even     Review of patient's allergies indicates:   Allergen Reactions    Dupixent pen [dupilumab] Other (See Comments)     The patient claims to have flu like symptoms after injection    Tramadol Itching    Celery (apium graveolens) (umbelliferae)     Chicken derived     Corn     Grass pollen-june grass standard      TREE POLLEN    BRADEN GRASS     Milk      ALL DAIRY     Peanut      WALNUTS PECANS     Shrimp     Soy     Wheat      Past Medical History:   Diagnosis Date    Allergy     Asthma     Hyperlipidemia     IBS (irritable bowel syndrome)     Lactose intolerance     Sinusitis      Patient identifiers verified and correct for Darshana Flor    LOC: The patient is awake, alert, and aware of environment. The patient is AOX4 and speaking appropriately.   APPEARANCE: No acute distress noted.   HEENT: Reports pain with swallowing and pressure to left head, PERRLA  PSYCHOSOCIAL: Patient is calm and cooperative. Denies SI/HI.  SKIN: The skin is warm, dry, color consistent with ethnicity. No breakdown or brusing visible.  RESPIRATORY: Airway is open and patent. Bilateral chest rise and fall. Respiratory rate even and unlabored.  No accessory muscle use noted.  CARDIAC: Patient has a normal rate and rhythm. No complaints of chest " pain.  ABDOMEN/GI: Soft, non tender. No distention noted. Denies vomiting. Reports nausea and diarrhea.  URINARY:  Voids independently without difficulty. No complaints of frequency, urgency, burning, or blood in urine.   NEUROLOGIC: Eyes open spontaneously. Speech clear.  Able to follow commands, demonstrating ability to actively and appropriately communicate within context of current conversation. Symmetrical facial muscles. Movement is purposeful. Denies dizziness/lightheadedness.  MUSCULOSKELETAL: No obvious deformities noted. Full ROM in all extremities.  PERIPHERAL VASCULAR: Cap refill <3 secs bilaterally. No peripheral edema noted. Denies numbness and tingling in extremities.

## 2022-04-20 LAB — TRYPTASE LEVEL: 6.2 NG/ML

## 2022-04-28 ENCOUNTER — PATIENT MESSAGE (OUTPATIENT)
Dept: PHARMACY | Facility: CLINIC | Age: 37
End: 2022-04-28
Payer: MEDICAID

## 2022-05-02 ENCOUNTER — SPECIALTY PHARMACY (OUTPATIENT)
Dept: PHARMACY | Facility: CLINIC | Age: 37
End: 2022-05-02
Payer: MEDICAID

## 2022-05-02 NOTE — TELEPHONE ENCOUNTER
Specialty Pharmacy - Refill Coordination    Specialty Medication Orders Linked to Encounter    Flowsheet Row Most Recent Value   Medication #1 adalimumab (HUMIRA,CF, PEN) 40 mg/0.4 mL PnKt (Order#[id number], Rx#[id number])          Refill Questions - Documented Responses    Flowsheet Row Most Recent Value   Patient Availability and HIPAA Verification    Does patient want to proceed with activity? Yes   HIPAA/medical authority confirmed? Yes   Relationship to patient of person spoken to? Self   Refill Screening Questions    Changes to allergies? No   Changes to medications? No   New conditions since last clinic visit? No   Unplanned office visit, urgent care, ED, or hospital admission in the last 4 weeks? Yes  [allergic rxn to dupixent]   How does patient/caregiver feel medication is working? Very good   Financial problems or insurance changes? No   How many doses of your specialty medications were missed in the last 4 weeks? 0   Would patient like to speak to a pharmacist? No   When does the patient need to receive the medication? 05/05/22   Refill Delivery Questions    How will the patient receive the medication? Pickup   When does the patient need to receive the medication? 05/05/22   Expected Copay ($) 0   Is the patient able to afford the medication copay? Yes   Payment Method zero copay   Days supply of Refill 28   Supplies needed? No supplies needed   Refill activity completed? Yes   Refill activity plan Refill scheduled   Shipment/Pickup Date: 05/04/22          Current Outpatient Medications   Medication Sig    acetaminophen-codeine 300-30mg (TYLENOL #3) 300-30 mg Tab Take 1 tablet by mouth every 6 (six) hours as needed.    adalimumab (HUMIRA,CF, PEN) 40 mg/0.4 mL PnKt Inject 0.4 mLs (40 mg total) into the skin every 14 (fourteen) days.    ascorbic acid, vitamin C, (VITAMIN C) 1000 MG tablet Take 1,000 mg by mouth once daily.    dicyclomine (BENTYL) 20 mg tablet Take 20 mg by mouth 4 (four) times daily as  needed.    diphenhydrAMINE (BENADRYL) 25 mg capsule Take 25 mg by mouth every 6 (six) hours as needed for Itching.    EPINEPHrine (EPIPEN) 0.3 mg/0.3 mL AtIn INJ 1 PEN SC PRN UTD    ergocalciferol (ERGOCALCIFEROL) 50,000 unit Cap Take 1 capsule every week by oral route.    fluticasone propionate (FLONASE) 50 mcg/actuation nasal spray SHAKE LQ AND U 1 SPR IEN BID    Lactobacillus rhamnosus GG (CULTURELLE) 10 billion cell capsule Take 1 capsule by mouth once daily.    levocetirizine (XYZAL) 5 MG tablet SMARTSI Tablet(s) By Mouth Every Evening    loratadine (CLARITIN) 10 mg tablet Take 1 tablet every day by oral route for 30 days.    multivitamin with minerals tablet Take 1 tablet by mouth once daily.    terbinafine HCL (LAMISIL) 250 mg tablet Take 250 mg by mouth once daily.    triamcinolone acetonide 0.1% (KENALOG) 0.1 % cream APPLY THIN LAYER TOPICALLY TO THE AFFECTED AREA TWICE DAILY   Last reviewed on 2022  1:03 PM by Fabiola Morton RN    Review of patient's allergies indicates:   Allergen Reactions    Dupixent pen [dupilumab] Other (See Comments)     The patient claims to have flu like symptoms after injection    Tramadol Itching    Celery (apium graveolens) (umbelliferae)     Chicken derived     Corn     Grass pollen- grass standard      TREE POLLEN    BRADEN GRASS     Milk      ALL DAIRY     Peanut      WALNUTS PECANS     Shrimp     Soy     Wheat     Last reviewed on  2022 1:03 PM by Fabiola Morton      Tasks added this encounter   2022 - Refill Call (Auto Added)  2022 - Pickup Reminder   Tasks due within next 3 months   No tasks due.     Darcy Moscoso, PharmD  [facility]  [address] [place] [address]  Phone: [phone]  Fax: [phone]

## 2022-05-26 ENCOUNTER — SPECIALTY PHARMACY (OUTPATIENT)
Dept: PHARMACY | Facility: CLINIC | Age: 37
End: 2022-05-26
Payer: MEDICAID

## 2022-05-26 DIAGNOSIS — D86.89 SARCOIDOSIS OF DIGESTIVE SYSTEM: ICD-10-CM

## 2022-05-26 DIAGNOSIS — M45.8 ANKYLOSING SPONDYLITIS OF SACRAL REGION: ICD-10-CM

## 2022-05-26 DIAGNOSIS — M79.10 MYALGIA: ICD-10-CM

## 2022-05-26 DIAGNOSIS — R53.83 FATIGUE, UNSPECIFIED TYPE: ICD-10-CM

## 2022-05-26 DIAGNOSIS — D84.9 IMMUNOSUPPRESSION: ICD-10-CM

## 2022-05-26 RX ORDER — ADALIMUMAB 40MG/0.4ML
40 KIT SUBCUTANEOUS
Qty: 2 PEN | Refills: 2 | OUTPATIENT
Start: 2022-05-26 | End: 2022-08-18

## 2022-05-26 NOTE — TELEPHONE ENCOUNTER
Contaced patient regarding Humira refill. Informed patient that he is out of refills and that a refill request was sent to [facility]. Patient is due to inject 6/2. Will follow-up once request is fulfilled.

## 2022-05-30 ENCOUNTER — PATIENT MESSAGE (OUTPATIENT)
Dept: PHARMACY | Facility: CLINIC | Age: 37
End: 2022-05-30
Payer: MEDICAID

## 2022-05-30 ENCOUNTER — LAB VISIT (OUTPATIENT)
Dept: LAB | Facility: HOSPITAL | Age: 37
End: 2022-05-30
Attending: INTERNAL MEDICINE
Payer: MEDICAID

## 2022-05-30 DIAGNOSIS — D84.9 IMMUNOSUPPRESSION: ICD-10-CM

## 2022-05-30 DIAGNOSIS — R53.83 FATIGUE, UNSPECIFIED TYPE: ICD-10-CM

## 2022-05-30 DIAGNOSIS — M45.8 ANKYLOSING SPONDYLITIS OF SACRAL REGION: ICD-10-CM

## 2022-05-30 LAB
ALBUMIN SERPL BCP-MCNC: 4 G/DL (ref 3.5–5.2)
ALP SERPL-CCNC: 72 U/L (ref 55–135)
ALT SERPL W/O P-5'-P-CCNC: 11 U/L (ref 10–44)
ANION GAP SERPL CALC-SCNC: 12 MMOL/L (ref 8–16)
AST SERPL-CCNC: 14 U/L (ref 10–40)
BASOPHILS # BLD AUTO: 0.06 K/UL (ref 0–0.2)
BASOPHILS NFR BLD: 0.7 % (ref 0–1.9)
BILIRUB SERPL-MCNC: 0.6 MG/DL (ref 0.1–1)
BUN SERPL-MCNC: 11 MG/DL (ref 6–20)
CALCIUM SERPL-MCNC: 9.6 MG/DL (ref 8.7–10.5)
CHLORIDE SERPL-SCNC: 99 MMOL/L (ref 95–110)
CO2 SERPL-SCNC: 27 MMOL/L (ref 23–29)
CREAT SERPL-MCNC: 0.9 MG/DL (ref 0.5–1.4)
CRP SERPL-MCNC: 0.8 MG/L (ref 0–8.2)
DIFFERENTIAL METHOD: ABNORMAL
EOSINOPHIL # BLD AUTO: 0.1 K/UL (ref 0–0.5)
EOSINOPHIL NFR BLD: 1 % (ref 0–8)
ERYTHROCYTE [DISTWIDTH] IN BLOOD BY AUTOMATED COUNT: 13.3 % (ref 11.5–14.5)
ERYTHROCYTE [SEDIMENTATION RATE] IN BLOOD BY WESTERGREN METHOD: 17 MM/HR (ref 0–23)
EST. GFR  (AFRICAN AMERICAN): >60 ML/MIN/1.73 M^2
EST. GFR  (NON AFRICAN AMERICAN): >60 ML/MIN/1.73 M^2
GLUCOSE SERPL-MCNC: 105 MG/DL (ref 70–110)
HCT VFR BLD AUTO: 46.1 % (ref 40–54)
HGB BLD-MCNC: 15.5 G/DL (ref 14–18)
IMM GRANULOCYTES # BLD AUTO: 0.03 K/UL (ref 0–0.04)
IMM GRANULOCYTES NFR BLD AUTO: 0.4 % (ref 0–0.5)
LYMPHOCYTES # BLD AUTO: 4.8 K/UL (ref 1–4.8)
LYMPHOCYTES NFR BLD: 57.4 % (ref 18–48)
MCH RBC QN AUTO: 29.1 PG (ref 27–31)
MCHC RBC AUTO-ENTMCNC: 33.6 G/DL (ref 32–36)
MCV RBC AUTO: 87 FL (ref 82–98)
MONOCYTES # BLD AUTO: 0.7 K/UL (ref 0.3–1)
MONOCYTES NFR BLD: 7.9 % (ref 4–15)
NEUTROPHILS # BLD AUTO: 2.7 K/UL (ref 1.8–7.7)
NEUTROPHILS NFR BLD: 32.6 % (ref 38–73)
NRBC BLD-RTO: 0 /100 WBC
PLATELET # BLD AUTO: 333 K/UL (ref 150–450)
PMV BLD AUTO: 10.5 FL (ref 9.2–12.9)
POTASSIUM SERPL-SCNC: 3.9 MMOL/L (ref 3.5–5.1)
PROT SERPL-MCNC: 7.7 G/DL (ref 6–8.4)
RBC # BLD AUTO: 5.32 M/UL (ref 4.6–6.2)
SODIUM SERPL-SCNC: 138 MMOL/L (ref 136–145)
WBC # BLD AUTO: 8.38 K/UL (ref 3.9–12.7)

## 2022-05-30 PROCEDURE — 85025 COMPLETE CBC W/AUTO DIFF WBC: CPT | Performed by: INTERNAL MEDICINE

## 2022-05-30 PROCEDURE — 85652 RBC SED RATE AUTOMATED: CPT | Performed by: INTERNAL MEDICINE

## 2022-05-30 PROCEDURE — 86140 C-REACTIVE PROTEIN: CPT | Performed by: INTERNAL MEDICINE

## 2022-05-30 PROCEDURE — 80053 COMPREHEN METABOLIC PANEL: CPT | Performed by: INTERNAL MEDICINE

## 2022-05-30 PROCEDURE — 36415 COLL VENOUS BLD VENIPUNCTURE: CPT | Performed by: INTERNAL MEDICINE

## 2022-05-31 ENCOUNTER — PATIENT MESSAGE (OUTPATIENT)
Dept: RHEUMATOLOGY | Facility: CLINIC | Age: 37
End: 2022-05-31
Payer: MEDICAID

## 2022-05-31 DIAGNOSIS — M79.10 MYALGIA: ICD-10-CM

## 2022-05-31 DIAGNOSIS — R53.83 FATIGUE, UNSPECIFIED TYPE: ICD-10-CM

## 2022-05-31 DIAGNOSIS — D86.89 SARCOIDOSIS OF DIGESTIVE SYSTEM: ICD-10-CM

## 2022-05-31 DIAGNOSIS — M45.8 ANKYLOSING SPONDYLITIS OF SACRAL REGION: ICD-10-CM

## 2022-05-31 DIAGNOSIS — D84.9 IMMUNOSUPPRESSION: ICD-10-CM

## 2022-05-31 RX ORDER — ADALIMUMAB 40MG/0.4ML
40 KIT SUBCUTANEOUS
Qty: 2 PEN | Refills: 2 | Status: SHIPPED | OUTPATIENT
Start: 2022-05-31 | End: 2022-08-18 | Stop reason: SDUPTHER

## 2022-06-01 NOTE — TELEPHONE ENCOUNTER
Specialty Pharmacy - Refill Coordination  Specialty Pharmacy - Clinical Reassessment    Specialty Medication Orders Linked to Encounter    Flowsheet Row Most Recent Value   Medication #1 adalimumab (HUMIRA,CF, PEN) 40 mg/0.4 mL PnKt (Order#[id number], Rx#[id number])        Patient Diagnosis   M45.8 - Ankylosing spondylitis of sacral region    Specialty clinical pharmacist review completed for an annual review of reassessment. Reviewed the following areas: current med list, reports of adverse effects, adherence and progress towards therapeutic goals.    Recommendations: none at this time.    Tasks added this encounter   6/23/2022 - Refill Call (Auto Added)  6/2/2022 - Pickup Reminder   Tasks due within next 3 months   No tasks due.     Darcy Moscoso, PharmD  [facility]  [address]  [facility] [address]  Phone: [phone]  Fax: [phone]

## 2022-06-01 NOTE — TELEPHONE ENCOUNTER
Specialty Pharmacy - Refill Coordination  Specialty Pharmacy - Clinical Reassessment    Specialty Medication Orders Linked to Encounter    Flowsheet Row Most Recent Value   Medication #1 adalimumab (HUMIRA,CF, PEN) 40 mg/0.4 mL PnKt (Order#[id number], Rx#[id number])          Refill Questions - Documented Responses    Flowsheet Row Most Recent Value   Patient Availability and HIPAA Verification    Does patient want to proceed with activity? Yes   HIPAA/medical authority confirmed? Yes   Relationship to patient of person spoken to? Self   Refill Screening Questions    Changes to allergies? No   Changes to medications? No   New conditions since last clinic visit? No   Unplanned office visit, urgent care, ED, or hospital admission in the last 4 weeks? No   How does patient/caregiver feel medication is working? Very good   Financial problems or insurance changes? No   How many doses of your specialty medications were missed in the last 4 weeks? 0   Would patient like to speak to a pharmacist? No   When does the patient need to receive the medication? 06/02/22   Refill Delivery Questions    How will the patient receive the medication? Pickup   When does the patient need to receive the medication? 06/02/22   Expected Copay ($) 0   Is the patient able to afford the medication copay? Yes   Payment Method zero copay   Days supply of Refill 28   Supplies needed? No supplies needed   Refill activity completed? Yes   Refill activity plan Refill scheduled   Shipment/Pickup Date: 06/01/22          Current Outpatient Medications   Medication Sig    acetaminophen-codeine 300-30mg (TYLENOL #3) 300-30 mg Tab Take 1 tablet by mouth every 6 (six) hours as needed.    adalimumab (HUMIRA,CF, PEN) 40 mg/0.4 mL PnKt Inject 0.4 mLs (40 mg total) into the skin every 14 (fourteen) days.    ascorbic acid, vitamin C, (VITAMIN C) 1000 MG tablet Take 1,000 mg by mouth once daily.    dicyclomine (BENTYL) 20 mg tablet Take 20 mg by mouth 4 (four)  times daily as needed.    diphenhydrAMINE (BENADRYL) 25 mg capsule Take 25 mg by mouth every 6 (six) hours as needed for Itching.    EPINEPHrine (EPIPEN) 0.3 mg/0.3 mL AtIn INJ 1 PEN SC PRN UTD    ergocalciferol (ERGOCALCIFEROL) 50,000 unit Cap Take 1 capsule every week by oral route.    fluticasone propionate (FLONASE) 50 mcg/actuation nasal spray SHAKE LQ AND U 1 SPR IEN BID    Lactobacillus rhamnosus GG (CULTURELLE) 10 billion cell capsule Take 1 capsule by mouth once daily.    levocetirizine (XYZAL) 5 MG tablet SMARTSI Tablet(s) By Mouth Every Evening    loratadine (CLARITIN) 10 mg tablet Take 1 tablet every day by oral route for 30 days.    multivitamin with minerals tablet Take 1 tablet by mouth once daily.    terbinafine HCL (LAMISIL) 250 mg tablet Take 250 mg by mouth once daily.    triamcinolone acetonide 0.1% (KENALOG) 0.1 % cream APPLY THIN LAYER TOPICALLY TO THE AFFECTED AREA TWICE DAILY   Last reviewed on 2022 11:34 AM by Darcy Moscoso, PharmD    Review of patient's allergies indicates:   Allergen Reactions    Dupixent pen [dupilumab] Other (See Comments)     The patient claims to have flu like symptoms after injection    Tramadol Itching    Celery (apium graveolens) (umbelliferae)     Chicken derived     Corn     Grass pollen- grass standard      TREE POLLEN    BRADEN GRASS     Milk      ALL DAIRY     Peanut      WALNUTS PECANS     Shrimp     Soy     Wheat     Last reviewed on  2022 4:12 PM by Josefa Pastrana    Interventions added this encounter   Closed: [facility] Patient Assessment     Tasks added this encounter   2022 - Refill Call (Auto Added)  2022 - Pickup Reminder   Tasks due within next 3 months   No tasks due.     Darcy Moscoso, PharmD  [facility]  [address] [place] [address]  Phone: [phone]  Fax: [phone]

## 2022-06-23 ENCOUNTER — SPECIALTY PHARMACY (OUTPATIENT)
Dept: PHARMACY | Facility: CLINIC | Age: 37
End: 2022-06-23
Payer: MEDICAID

## 2022-06-24 NOTE — TELEPHONE ENCOUNTER
Specialty Pharmacy - Refill Coordination    Specialty Medication Orders Linked to Encounter    Flowsheet Row Most Recent Value   Medication #1 adalimumab (HUMIRA,CF, PEN) 40 mg/0.4 mL PnKt (Order#[id number], Rx#[id number])          Refill Questions - Documented Responses    Flowsheet Row Most Recent Value   Patient Availability and HIPAA Verification    Does patient want to proceed with activity? Yes   HIPAA/medical authority confirmed? Yes   Relationship to patient of person spoken to? Self   Refill Screening Questions    Changes to allergies? No   Changes to medications? No   New conditions since last clinic visit? No   Unplanned office visit, urgent care, ED, or hospital admission in the last 4 weeks? No   How does patient/caregiver feel medication is working? Good   Financial problems or insurance changes? No   How many doses of your specialty medications were missed in the last 4 weeks? 0   Would patient like to speak to a pharmacist? No   When does the patient need to receive the medication? 06/30/22   Refill Delivery Questions    When does the patient need to receive the medication? 06/30/22   Shipping Address Home   Address in [facility] confirmed and updated if neccessary? Yes   Expected Copay ($) 0   Is the patient able to afford the medication copay? Yes   Payment Method zero copay   Days supply of Refill 28   Supplies needed? No supplies needed   Refill activity completed? Yes   Refill activity plan Refill scheduled   Shipment/Pickup Date: 06/28/22          Current Outpatient Medications   Medication Sig    acetaminophen-codeine 300-30mg (TYLENOL #3) 300-30 mg Tab Take 1 tablet by mouth every 6 (six) hours as needed.    adalimumab (HUMIRA,CF, PEN) 40 mg/0.4 mL PnKt Inject 0.4 mLs (40 mg total) into the skin every 14 (fourteen) days.    ascorbic acid, vitamin C, (VITAMIN C) 1000 MG tablet Take 1,000 mg by mouth once daily.    dicyclomine (BENTYL) 20 mg tablet Take 20 mg by mouth 4 (four) times  daily as needed.    diphenhydrAMINE (BENADRYL) 25 mg capsule Take 25 mg by mouth every 6 (six) hours as needed for Itching.    EPINEPHrine (EPIPEN) 0.3 mg/0.3 mL AtIn INJ 1 PEN SC PRN UTD    ergocalciferol (ERGOCALCIFEROL) 50,000 unit Cap Take 1 capsule every week by oral route.    fluticasone propionate (FLONASE) 50 mcg/actuation nasal spray SHAKE LQ AND U 1 SPR IEN BID    Lactobacillus rhamnosus GG (CULTURELLE) 10 billion cell capsule Take 1 capsule by mouth once daily.    levocetirizine (XYZAL) 5 MG tablet SMARTSI Tablet(s) By Mouth Every Evening    loratadine (CLARITIN) 10 mg tablet Take 1 tablet every day by oral route for 30 days.    multivitamin with minerals tablet Take 1 tablet by mouth once daily.    terbinafine HCL (LAMISIL) 250 mg tablet Take 250 mg by mouth once daily.    triamcinolone acetonide 0.1% (KENALOG) 0.1 % cream APPLY THIN LAYER TOPICALLY TO THE AFFECTED AREA TWICE DAILY   Last reviewed on 2022 11:34 AM by Darcy Moscoso, PharmD    Review of patient's allergies indicates:   Allergen Reactions    Dupixent pen [dupilumab] Other (See Comments)     The patient claims to have flu like symptoms after injection    Tramadol Itching    Celery (apium graveolens) (umbelliferae)     Chicken derived     Corn     Grass pollen- grass standard      TREE POLLEN    BRADEN GRASS     Milk      ALL DAIRY     Peanut      WALNUTS PECANS     Shrimp     Soy     Wheat     Last reviewed on  2022 4:12 PM by Josefa Pastrana      Tasks added this encounter   No tasks added.   Tasks due within next 3 months   2022 - Refill Call (Auto Added)     Kenny Olvera [facility]  [address] [place] [address]  Phone: [phone]  Fax: [phone]

## 2022-07-07 ENCOUNTER — PATIENT MESSAGE (OUTPATIENT)
Dept: RHEUMATOLOGY | Facility: CLINIC | Age: 37
End: 2022-07-07
Payer: MEDICAID

## 2022-07-12 ENCOUNTER — OFFICE VISIT (OUTPATIENT)
Dept: RHEUMATOLOGY | Facility: CLINIC | Age: 37
End: 2022-07-12
Payer: MEDICAID

## 2022-07-12 VITALS
HEIGHT: 72 IN | HEART RATE: 70 BPM | BODY MASS INDEX: 28.66 KG/M2 | DIASTOLIC BLOOD PRESSURE: 73 MMHG | WEIGHT: 211.63 LBS | SYSTOLIC BLOOD PRESSURE: 121 MMHG

## 2022-07-12 DIAGNOSIS — M45.8 ANKYLOSING SPONDYLITIS OF SACRAL REGION: Primary | ICD-10-CM

## 2022-07-12 DIAGNOSIS — D84.9 IMMUNOSUPPRESSION: ICD-10-CM

## 2022-07-12 DIAGNOSIS — D86.89 SARCOIDOSIS OF DIGESTIVE SYSTEM: ICD-10-CM

## 2022-07-12 PROCEDURE — 3074F SYST BP LT 130 MM HG: CPT | Mod: CPTII,,, | Performed by: INTERNAL MEDICINE

## 2022-07-12 PROCEDURE — 3074F PR MOST RECENT SYSTOLIC BLOOD PRESSURE < 130 MM HG: ICD-10-PCS | Mod: CPTII,,, | Performed by: INTERNAL MEDICINE

## 2022-07-12 PROCEDURE — 1159F PR MEDICATION LIST DOCUMENTED IN MEDICAL RECORD: ICD-10-PCS | Mod: CPTII,,, | Performed by: INTERNAL MEDICINE

## 2022-07-12 PROCEDURE — 1160F RVW MEDS BY RX/DR IN RCRD: CPT | Mod: CPTII,,, | Performed by: INTERNAL MEDICINE

## 2022-07-12 PROCEDURE — 1159F MED LIST DOCD IN RCRD: CPT | Mod: CPTII,,, | Performed by: INTERNAL MEDICINE

## 2022-07-12 PROCEDURE — 99213 OFFICE O/P EST LOW 20 MIN: CPT | Mod: PBBFAC | Performed by: INTERNAL MEDICINE

## 2022-07-12 PROCEDURE — 3008F PR BODY MASS INDEX (BMI) DOCUMENTED: ICD-10-PCS | Mod: CPTII,,, | Performed by: INTERNAL MEDICINE

## 2022-07-12 PROCEDURE — 3078F PR MOST RECENT DIASTOLIC BLOOD PRESSURE < 80 MM HG: ICD-10-PCS | Mod: CPTII,,, | Performed by: INTERNAL MEDICINE

## 2022-07-12 PROCEDURE — 99999 PR PBB SHADOW E&M-EST. PATIENT-LVL III: ICD-10-PCS | Mod: PBBFAC,,, | Performed by: INTERNAL MEDICINE

## 2022-07-12 PROCEDURE — 99214 PR OFFICE/OUTPT VISIT, EST, LEVL IV, 30-39 MIN: ICD-10-PCS | Mod: S$PBB,,, | Performed by: INTERNAL MEDICINE

## 2022-07-12 PROCEDURE — 99214 OFFICE O/P EST MOD 30 MIN: CPT | Mod: S$PBB,,, | Performed by: INTERNAL MEDICINE

## 2022-07-12 PROCEDURE — 99999 PR PBB SHADOW E&M-EST. PATIENT-LVL III: CPT | Mod: PBBFAC,,, | Performed by: INTERNAL MEDICINE

## 2022-07-12 PROCEDURE — 3008F BODY MASS INDEX DOCD: CPT | Mod: CPTII,,, | Performed by: INTERNAL MEDICINE

## 2022-07-12 PROCEDURE — 3078F DIAST BP <80 MM HG: CPT | Mod: CPTII,,, | Performed by: INTERNAL MEDICINE

## 2022-07-12 PROCEDURE — 1160F PR REVIEW ALL MEDS BY PRESCRIBER/CLIN PHARMACIST DOCUMENTED: ICD-10-PCS | Mod: CPTII,,, | Performed by: INTERNAL MEDICINE

## 2022-07-12 NOTE — PROGRESS NOTES
Rapid3 Question Responses and Scores 7/11/2022   MDHAQ Score 0   Psychologic Score 0   Pain Score 9   When you awakened in the morning OVER THE LAST WEEK, did you feel stiff? Yes   If Yes, please indicate the number of hours until you are as limber as you will be for the day 1   Fatigue Score 0   Global Health Score 2   RAPID3 Score 3.67     Answers for HPI/ROS submitted by the patient on 7/11/2022  fever: No  eye redness: No  mouth sores: No  headaches: No  shortness of breath: No  chest pain: No  trouble swallowing: No  diarrhea: No  constipation: No  unexpected weight change: No  genital sore: No  During the last 3 days, have you had a skin rash?: No  Bruises or bleeds easily: No  cough: No

## 2022-07-12 NOTE — PROGRESS NOTES
"Subjective:       Patient ID: Darshana Flor is a 36 y.o. male.    Chief Complaint: ankylosing spondylitis    HPI:  Darshana Flor is a 36 y.o. male with history of asthma diagnosed with sarcoidosis based on colonic biopsy with noncaseating granulomas.  Consultation for sarcoidosis from primary.  In 2018 had multiple episodes of bloody diarrhea.  Urgent care diagnosed hemorrhoids.  In 2020 evaluated by GI and had 2 colonoscopy.  Colonoscopy biopsy showed non caseating granulomas in the ileum and colon.  Lab showed positive Saccharomyces cerevisae IgG and IgM suggestive of Crohns.    Referred to pulmonary Dr. Shepherd who evaluated patient with x-ray, PFTs and CT chest.  No changes of sarcoid noted but pulmonary suspects CP.  Since 2017 has had a "knot" in his chest that causes intermittent CP worse with leaning back or breathing in.  Improves with steroid from primary.  Denies dysphagia or dynophagia.  Swallowing study normal.   The chronic chest pain felt to be musculoskeletal and not cardiac after cardiology work up.  Pain in muscles and joints shoulders, lower back, hands and hips.  Pain up to 10/10 ache intermittent.  Sudden stiffness in back occurs.   Awakens him from sleep.  Improves with steroids.   Morning stiffness for all day at times.   Pain awakens him from sleep.  Alternating buttock pain.  Exercise helps pain.   Not able to get in car due to pain.    Sudden onset lower extremity weakness that occurs every few month since 20s and he will fall.     Lupus Review of Systems  Alopecia: no  Photosensitivity: no   Raynaud's: no  Oral or nasal ulcers: no  Rashes: no  No pleurisy or pericarditis. (now with pleuritic CP)  No seizures, psychosis, or stroke.  No venous or arterial clots.  Pregnancy hx (if applicable): N/A      INTERVAL HISTORY:      Shooting pain from knee down to left lateral leg.   Stretching relieves pain.    It occurs intermittently at times up to a month a part.   Had sinus surgery which " has helped.  He does nasal rinse and nasal steroid.     He gets allergy shots.   Stopped Dupixent due to allergic.       Allergist Dr. Cris Lima at [facility] .    Recommended trying allergy shots and if no improvement then Dupixent.  He needs to schedule a follow up.     Currently no pain.  Notes no morning stiffness.       Review of Systems   Constitutional: Negative for appetite change, chills, fever and unexpected weight change.   HENT: Negative for mouth sores and trouble swallowing.         Allergies   Eyes: Negative for redness.   Respiratory: Negative.  Negative for cough and shortness of breath.    Cardiovascular: Negative.  Negative for chest pain.   Gastrointestinal: Negative.  Negative for abdominal pain, constipation, diarrhea and vomiting.   Endocrine: Negative.    Genitourinary: Negative.  Negative for genital sores.   Musculoskeletal: Negative for arthralgias.   Skin: Negative.  Negative for rash.   Allergic/Immunologic: Positive for environmental allergies.   Neurological: Positive for headaches (sinus HA yesterday; will see allergist).   Hematological: Negative.  Does not bruise/bleed easily.   Psychiatric/Behavioral: Negative.          Objective:   /73   Pulse 70   Ht 6' (1.829 m)   Wt 96 kg (211 lb 10.3 oz)   BMI 28.70 kg/m²      Physical Exam   Constitutional: He is oriented to person, place, and time.   HENT:   Head: Normocephalic and atraumatic.   Eyes: Conjunctivae are normal.   Cardiovascular: Normal rate, regular rhythm and normal heart sounds.   Pulmonary/Chest: Effort normal and breath sounds normal.   Abdominal: Soft. Bowel sounds are normal.   Musculoskeletal:      Cervical back: Normal range of motion and neck supple.      Comments: Schober 4 cm  Chest expansion 5 cm  No pain bilateral SI joints  Occiput to wall- able to touch occiput to wall     Neurological: He is alert and oriented to person, place, and time. Gait normal.   Skin: Skin is warm  and dry.   Psychiatric: Mood and affect normal.     LABS    Component      Latest Ref Rng & Units 5/30/2022   WBC      3.90 - 12.70 K/uL 8.38   RBC      4.60 - 6.20 M/uL 5.32   Hemoglobin      14.0 - 18.0 g/dL 15.5   Hematocrit      40.0 - 54.0 % 46.1   MCV      82 - 98 fL 87   MCH      27.0 - 31.0 pg 29.1   MCHC      32.0 - 36.0 g/dL 33.6   RDW      11.5 - 14.5 % 13.3   Platelets      150 - 450 K/uL 333   MPV      9.2 - 12.9 fL 10.5   Immature Granulocytes      0.0 - 0.5 % 0.4   Gran # (ANC)      1.8 - 7.7 K/uL 2.7   Immature Grans (Abs)      0.00 - 0.04 K/uL 0.03   Lymph #      1.0 - 4.8 K/uL 4.8   Mono #      0.3 - 1.0 K/uL 0.7   Eos #      0.0 - 0.5 K/uL 0.1   Baso #      0.00 - 0.20 K/uL 0.06   nRBC      0 /100 WBC 0   Gran %      38.0 - 73.0 % 32.6 (L)   Lymph %      18.0 - 48.0 % 57.4 (H)   Mono %      4.0 - 15.0 % 7.9   Eosinophil %      0.0 - 8.0 % 1.0   Basophil %      0.0 - 1.9 % 0.7   Differential Method       Automated   Sodium      136 - 145 mmol/L 138   Potassium      3.5 - 5.1 mmol/L 3.9   Chloride      95 - 110 mmol/L 99   CO2      23 - 29 mmol/L 27   Glucose      70 - 110 mg/dL 105   BUN      6 - 20 mg/dL 11   Creatinine      0.5 - 1.4 mg/dL 0.9   Calcium      8.7 - 10.5 mg/dL 9.6   PROTEIN TOTAL      6.0 - 8.4 g/dL 7.7   Albumin      3.5 - 5.2 g/dL 4.0   BILIRUBIN TOTAL      0.1 - 1.0 mg/dL 0.6   Alkaline Phosphatase      55 - 135 U/L 72   AST      10 - 40 U/L 14   ALT      10 - 44 U/L 11   Anion Gap      8 - 16 mmol/L 12   eGFR if African American      >60 mL/min/1.73 m:2 >60.0   eGFR if non African American      >60 mL/min/1.73 m:2 >60.0   Sed Rate      0 - 23 mm/Hr 17   CRP      0.0 - 8.2 mg/L 0.8       Assessment:       1.  Ankylosing spondylitis.  Musculoskeletal Chest and back pain.  Abnormal occiput to wall, chest wall expansion, Schobers and SI joint pain along with inflammatory back pain.   Now doing well.   2.  Sarcoidosis of GI tract.  Will need to be followed  3.  Positive antibody for  Crohns  4.  Myalgias  5.  Joint pains  6.  Fatigue  7.  Childhood asthma  8.  Childhood allergies  9.  Rash on neck.  Ointment from primary  10. Left 4th toe nail fungus.  Topical treatment from primary.  11. Recurrent sinusitis    DDX  Ankylosing spondylitis, Crohns, sarcoidosis  Plan:       1.  Continue Humira.  2.  Check labs   3.  Follow with ENT  4.  Restart exercises taught in PT.       RTO in 6 months/prn

## 2022-07-21 ENCOUNTER — SPECIALTY PHARMACY (OUTPATIENT)
Dept: PHARMACY | Facility: CLINIC | Age: 37
End: 2022-07-21
Payer: MEDICAID

## 2022-07-21 NOTE — TELEPHONE ENCOUNTER
Specialty Pharmacy - Refill Coordination    Specialty Medication Orders Linked to Encounter    Flowsheet Row Most Recent Value   Medication #1 adalimumab (HUMIRA,CF, PEN) 40 mg/0.4 mL PnKt (Order#[id number], Rx#[id number])          Refill Questions - Documented Responses    Flowsheet Row Most Recent Value   Patient Availability and HIPAA Verification    Does patient want to proceed with activity? Yes   HIPAA/medical authority confirmed? Yes   Relationship to patient of person spoken to? Self   Refill Screening Questions    Changes to allergies? No   Changes to medications? No   New conditions since last clinic visit? No   Unplanned office visit, urgent care, ED, or hospital admission in the last 4 weeks? No   How does patient/caregiver feel medication is working? Good   Financial problems or insurance changes? No   How many doses of your specialty medications were missed in the last 4 weeks? 0   Would patient like to speak to a pharmacist? No   When does the patient need to receive the medication? 07/28/22   Refill Delivery Questions    How will the patient receive the medication? Pickup   When does the patient need to receive the medication? 07/28/22   Expected Copay ($) 0   Is the patient able to afford the medication copay? Yes   Payment Method zero copay   Days supply of Refill 28   Supplies needed? No supplies needed   Refill activity completed? Yes   Refill activity plan Refill scheduled   Shipment/Pickup Date: 07/25/22          Current Outpatient Medications   Medication Sig    acetaminophen-codeine 300-30mg (TYLENOL #3) 300-30 mg Tab Take 1 tablet by mouth every 6 (six) hours as needed.    adalimumab (HUMIRA,CF, PEN) 40 mg/0.4 mL PnKt Inject 0.4 mLs (40 mg total) into the skin every 14 (fourteen) days.    ascorbic acid, vitamin C, (VITAMIN C) 1000 MG tablet Take 1,000 mg by mouth once daily.    dicyclomine (BENTYL) 20 mg tablet Take 20 mg by mouth 4 (four) times daily as needed.    diphenhydrAMINE  (BENADRYL) 25 mg capsule Take 25 mg by mouth every 6 (six) hours as needed for Itching.    EPINEPHrine (EPIPEN) 0.3 mg/0.3 mL AtIn INJ 1 PEN SC PRN UTD    ergocalciferol (ERGOCALCIFEROL) 50,000 unit Cap Take 1 capsule every week by oral route.    fluticasone propionate (FLONASE) 50 mcg/actuation nasal spray SHAKE LQ AND U 1 SPR IEN BID    Lactobacillus rhamnosus GG (CULTURELLE) 10 billion cell capsule Take 1 capsule by mouth once daily.    levocetirizine (XYZAL) 5 MG tablet SMARTSI Tablet(s) By Mouth Every Evening    loratadine (CLARITIN) 10 mg tablet Take 1 tablet every day by oral route for 30 days.    multivitamin with minerals tablet Take 1 tablet by mouth once daily.    terbinafine HCL (LAMISIL) 250 mg tablet Take 250 mg by mouth once daily.    triamcinolone acetonide 0.1% (KENALOG) 0.1 % cream APPLY THIN LAYER TOPICALLY TO THE AFFECTED AREA TWICE DAILY   Last reviewed on 2022 10:24 AM by Josefa Pastrana MD    Review of patient's allergies indicates:   Allergen Reactions    Dupixent pen [dupilumab] Other (See Comments)     The patient claims to have flu like symptoms after injection    Tramadol Itching    Celery (apium graveolens) (umbelliferae)     Chicken derived     Corn     Grass pollen- grass standard      TREE POLLEN    BRADEN GRASS     Milk      ALL DAIRY     Peanut      WALNUTS PECANS     Shrimp     Soy     Wheat     Last reviewed on  2022 10:24 AM by Josefa Pastrana      Tasks added this encounter   2022 - Refill Call (Auto Added)  2022 - Pickup Reminder   Tasks due within next 3 months   No tasks due.     Mary Olvera [facility]  [address] [place] [address]  Phone: [phone]  Fax: [phone]

## 2022-08-18 ENCOUNTER — SPECIALTY PHARMACY (OUTPATIENT)
Dept: PHARMACY | Facility: CLINIC | Age: 37
End: 2022-08-18
Payer: MEDICAID

## 2022-08-18 DIAGNOSIS — R53.83 FATIGUE, UNSPECIFIED TYPE: ICD-10-CM

## 2022-08-18 DIAGNOSIS — M45.8 ANKYLOSING SPONDYLITIS OF SACRAL REGION: ICD-10-CM

## 2022-08-18 DIAGNOSIS — M79.10 MYALGIA: ICD-10-CM

## 2022-08-18 DIAGNOSIS — D84.9 IMMUNOSUPPRESSION: ICD-10-CM

## 2022-08-18 DIAGNOSIS — D86.89 SARCOIDOSIS OF DIGESTIVE SYSTEM: ICD-10-CM

## 2022-08-18 NOTE — TELEPHONE ENCOUNTER
Outgoing call to patient for humira refill --- next injection due 8/25. Refill request sent to MD and will follow up once approved.

## 2022-08-19 ENCOUNTER — PATIENT MESSAGE (OUTPATIENT)
Dept: PHARMACY | Facility: CLINIC | Age: 37
End: 2022-08-19
Payer: MEDICAID

## 2022-08-19 RX ORDER — ADALIMUMAB 40MG/0.4ML
40 KIT SUBCUTANEOUS
Qty: 2 PEN | Refills: 2 | Status: SHIPPED | OUTPATIENT
Start: 2022-08-19 | End: 2022-11-10 | Stop reason: SDUPTHER

## 2022-08-25 NOTE — TELEPHONE ENCOUNTER
Specialty Pharmacy - Refill Coordination    Specialty Medication Orders Linked to Encounter    Flowsheet Row Most Recent Value   Medication #1 adalimumab (HUMIRA,CF, PEN) 40 mg/0.4 mL PnKt (Order#[id number], Rx#[id number])        Refill Questions - Documented Responses    Flowsheet Row Most Recent Value   Patient Availability and HIPAA Verification    Does patient want to proceed with activity? Yes   HIPAA/medical authority confirmed? Yes   Relationship to patient of person spoken to? Self   Refill Screening Questions    Changes to allergies? No   Changes to medications? No   New conditions since last clinic visit? No   Unplanned office visit, urgent care, ED, or hospital admission in the last 4 weeks? No   How does patient/caregiver feel medication is working? Good   Financial problems or insurance changes? No   How many doses of your specialty medications were missed in the last 4 weeks? 0   Would patient like to speak to a pharmacist? No   When does the patient need to receive the medication? 08/25/22   Refill Delivery Questions    How will the patient receive the medication? Pickup   When does the patient need to receive the medication? 08/25/22   Shipping Address Home   Address in [facility] confirmed and updated if neccessary? Yes   Expected Copay ($) 0   Is the patient able to afford the medication copay? Yes   Payment Method zero copay   Days supply of Refill 28   Supplies needed? Injection Device   Refill activity completed? Yes   Refill activity plan Refill scheduled   Shipment/Pickup Date: 08/25/22          Current Outpatient Medications   Medication Sig    acetaminophen-codeine 300-30mg (TYLENOL #3) 300-30 mg Tab Take 1 tablet by mouth every 6 (six) hours as needed.    adalimumab (HUMIRA,CF, PEN) 40 mg/0.4 mL PnKt Inject 0.4 mLs (40 mg total) into the skin every 14 (fourteen) days.    ascorbic acid, vitamin C, (VITAMIN C) 1000 MG tablet Take 1,000 mg by mouth once daily.    dicyclomine  (BENTYL) 20 mg tablet Take 20 mg by mouth 4 (four) times daily as needed.    diphenhydrAMINE (BENADRYL) 25 mg capsule Take 25 mg by mouth every 6 (six) hours as needed for Itching.    EPINEPHrine (EPIPEN) 0.3 mg/0.3 mL AtIn INJ 1 PEN SC PRN UTD    ergocalciferol (ERGOCALCIFEROL) 50,000 unit Cap Take 1 capsule every week by oral route.    fluticasone propionate (FLONASE) 50 mcg/actuation nasal spray SHAKE LQ AND U 1 SPR IEN BID    Lactobacillus rhamnosus GG (CULTURELLE) 10 billion cell capsule Take 1 capsule by mouth once daily.    levocetirizine (XYZAL) 5 MG tablet SMARTSI Tablet(s) By Mouth Every Evening    loratadine (CLARITIN) 10 mg tablet Take 1 tablet every day by oral route for 30 days.    multivitamin with minerals tablet Take 1 tablet by mouth once daily.    terbinafine HCL (LAMISIL) 250 mg tablet Take 250 mg by mouth once daily.    triamcinolone acetonide 0.1% (KENALOG) 0.1 % cream APPLY THIN LAYER TOPICALLY TO THE AFFECTED AREA TWICE DAILY   Last reviewed on 2022 10:24 AM by Josefa Pastrana MD    Review of patient's allergies indicates:   Allergen Reactions    Dupixent pen [dupilumab] Other (See Comments)     The patient claims to have flu like symptoms after injection    Tramadol Itching    Celery (apium graveolens) (umbelliferae)     Chicken derived     Corn     Grass pollen- grass standard      TREE POLLEN    BRADEN GRASS     Milk      ALL DAIRY     Peanut      WALNUTS PECANS     Shrimp     Soy     Wheat     Last reviewed on  2022 10:24 AM by Josefa Pastrana      Tasks added this encounter   9/15/2022 - Refill Call (Auto Added)  2022 - Pickup Reminder   Tasks due within next 3 months   No tasks due.     Willard Blanton, PharmD  [facility]  [address] [place] [address]  Phone: [phone]  Fax: [phone]

## 2022-08-31 ENCOUNTER — LAB VISIT (OUTPATIENT)
Dept: LAB | Facility: HOSPITAL | Age: 37
End: 2022-08-31
Attending: INTERNAL MEDICINE
Payer: MEDICAID

## 2022-08-31 DIAGNOSIS — R53.83 FATIGUE, UNSPECIFIED TYPE: ICD-10-CM

## 2022-08-31 DIAGNOSIS — M45.8 ANKYLOSING SPONDYLITIS OF SACRAL REGION: ICD-10-CM

## 2022-08-31 DIAGNOSIS — D84.9 IMMUNOSUPPRESSION: ICD-10-CM

## 2022-08-31 LAB
ALBUMIN SERPL BCP-MCNC: 4.2 G/DL (ref 3.5–5.2)
ALP SERPL-CCNC: 68 U/L (ref 55–135)
ALT SERPL W/O P-5'-P-CCNC: 11 U/L (ref 10–44)
ANION GAP SERPL CALC-SCNC: 8 MMOL/L (ref 8–16)
AST SERPL-CCNC: 15 U/L (ref 10–40)
BASOPHILS # BLD AUTO: 0.04 K/UL (ref 0–0.2)
BASOPHILS NFR BLD: 0.8 % (ref 0–1.9)
BILIRUB SERPL-MCNC: 0.6 MG/DL (ref 0.1–1)
BUN SERPL-MCNC: 9 MG/DL (ref 6–20)
CALCIUM SERPL-MCNC: 9.4 MG/DL (ref 8.7–10.5)
CHLORIDE SERPL-SCNC: 103 MMOL/L (ref 95–110)
CO2 SERPL-SCNC: 28 MMOL/L (ref 23–29)
CREAT SERPL-MCNC: 0.9 MG/DL (ref 0.5–1.4)
CRP SERPL-MCNC: 0.7 MG/L (ref 0–8.2)
DIFFERENTIAL METHOD: ABNORMAL
EOSINOPHIL # BLD AUTO: 0.4 K/UL (ref 0–0.5)
EOSINOPHIL NFR BLD: 7.2 % (ref 0–8)
ERYTHROCYTE [DISTWIDTH] IN BLOOD BY AUTOMATED COUNT: 13.1 % (ref 11.5–14.5)
ERYTHROCYTE [SEDIMENTATION RATE] IN BLOOD BY PHOTOMETRIC METHOD: 6 MM/HR (ref 0–23)
EST. GFR  (NO RACE VARIABLE): >60 ML/MIN/1.73 M^2
GLUCOSE SERPL-MCNC: 80 MG/DL (ref 70–110)
HCT VFR BLD AUTO: 46.1 % (ref 40–54)
HGB BLD-MCNC: 14.9 G/DL (ref 14–18)
IMM GRANULOCYTES # BLD AUTO: 0 K/UL (ref 0–0.04)
IMM GRANULOCYTES NFR BLD AUTO: 0 % (ref 0–0.5)
LYMPHOCYTES # BLD AUTO: 3.5 K/UL (ref 1–4.8)
LYMPHOCYTES NFR BLD: 68.5 % (ref 18–48)
MCH RBC QN AUTO: 29.3 PG (ref 27–31)
MCHC RBC AUTO-ENTMCNC: 32.3 G/DL (ref 32–36)
MCV RBC AUTO: 91 FL (ref 82–98)
MONOCYTES # BLD AUTO: 0.5 K/UL (ref 0.3–1)
MONOCYTES NFR BLD: 8.8 % (ref 4–15)
NEUTROPHILS # BLD AUTO: 0.8 K/UL (ref 1.8–7.7)
NEUTROPHILS NFR BLD: 14.7 % (ref 38–73)
NRBC BLD-RTO: 0 /100 WBC
PLATELET # BLD AUTO: 281 K/UL (ref 150–450)
PLATELET BLD QL SMEAR: ABNORMAL
PMV BLD AUTO: 10.9 FL (ref 9.2–12.9)
POTASSIUM SERPL-SCNC: 3.9 MMOL/L (ref 3.5–5.1)
PROT SERPL-MCNC: 7.4 G/DL (ref 6–8.4)
RBC # BLD AUTO: 5.08 M/UL (ref 4.6–6.2)
SODIUM SERPL-SCNC: 139 MMOL/L (ref 136–145)
WBC # BLD AUTO: 5.14 K/UL (ref 3.9–12.7)

## 2022-08-31 PROCEDURE — 86140 C-REACTIVE PROTEIN: CPT | Performed by: INTERNAL MEDICINE

## 2022-08-31 PROCEDURE — 85025 COMPLETE CBC W/AUTO DIFF WBC: CPT | Performed by: INTERNAL MEDICINE

## 2022-08-31 PROCEDURE — 80053 COMPREHEN METABOLIC PANEL: CPT | Performed by: INTERNAL MEDICINE

## 2022-08-31 PROCEDURE — 85652 RBC SED RATE AUTOMATED: CPT | Performed by: INTERNAL MEDICINE

## 2022-08-31 PROCEDURE — 36415 COLL VENOUS BLD VENIPUNCTURE: CPT | Performed by: INTERNAL MEDICINE

## 2022-09-02 ENCOUNTER — PATIENT MESSAGE (OUTPATIENT)
Dept: RHEUMATOLOGY | Facility: CLINIC | Age: 37
End: 2022-09-02
Payer: MEDICAID

## 2022-09-15 ENCOUNTER — PATIENT MESSAGE (OUTPATIENT)
Dept: PHARMACY | Facility: CLINIC | Age: 37
End: 2022-09-15
Payer: MEDICAID

## 2022-09-19 ENCOUNTER — SPECIALTY PHARMACY (OUTPATIENT)
Dept: PHARMACY | Facility: CLINIC | Age: 37
End: 2022-09-19
Payer: MEDICAID

## 2022-09-19 NOTE — TELEPHONE ENCOUNTER
Outgoing call - Informed patient that medication Humira needs Prior Authorization - Patient is aware. Informed Assigned pharmacist Darcy Moscoso of need for PA. Pending Refill call to Wednesday 9/21. Patient is due to inject on 9/22

## 2022-09-23 NOTE — TELEPHONE ENCOUNTER
Specialty Pharmacy - Refill Coordination  Specialty Pharmacy - Medication/Referral Authorization    Specialty Medication Orders Linked to Encounter      Flowsheet Row Most Recent Value   Medication #1 adalimumab (HUMIRA,CF, PEN) 40 mg/0.4 mL PnKt (Order#[id number], Rx#[id number])            Refill Questions - Documented Responses      Flowsheet Row Most Recent Value   Patient Availability and HIPAA Verification    Does patient want to proceed with activity? Yes   HIPAA/medical authority confirmed? Yes   Relationship to patient of person spoken to? Self   Refill Screening Questions    Changes to allergies? No   Changes to medications? No   New conditions since last clinic visit? No   Unplanned office visit, urgent care, ED, or hospital admission in the last 4 weeks? No   How does patient/caregiver feel medication is working? Very good   Financial problems or insurance changes? No   How many doses of your specialty medications were missed in the last 4 weeks? 0   Would patient like to speak to a pharmacist? No   When does the patient need to receive the medication? 09/23/22   Refill Delivery Questions    How will the patient receive the medication? Pickup   When does the patient need to receive the medication? 09/23/22   Expected Copay ($) 0   Is the patient able to afford the medication copay? Yes   Payment Method zero copay   Days supply of Refill 28   Supplies needed? No supplies needed   Refill activity completed? Yes   Refill activity plan Refill scheduled   Shipment/Pickup Date: 09/23/22            Current Outpatient Medications   Medication Sig    acetaminophen-codeine 300-30mg (TYLENOL #3) 300-30 mg Tab Take 1 tablet by mouth every 6 (six) hours as needed.    adalimumab (HUMIRA,CF, PEN) 40 mg/0.4 mL PnKt Inject 0.4 mLs (40 mg total) into the skin every 14 (fourteen) days.    ascorbic acid, vitamin C, (VITAMIN C) 1000 MG tablet Take 1,000 mg by mouth once daily.    dicyclomine (BENTYL) 20 mg tablet Take 20 mg  by mouth 4 (four) times daily as needed.    diphenhydrAMINE (BENADRYL) 25 mg capsule Take 25 mg by mouth every 6 (six) hours as needed for Itching.    EPINEPHrine (EPIPEN) 0.3 mg/0.3 mL AtIn INJ 1 PEN SC PRN UTD    ergocalciferol (ERGOCALCIFEROL) 50,000 unit Cap Take 1 capsule every week by oral route.    fluticasone propionate (FLONASE) 50 mcg/actuation nasal spray SHAKE LQ AND U 1 SPR IEN BID    Lactobacillus rhamnosus GG (CULTURELLE) 10 billion cell capsule Take 1 capsule by mouth once daily.    levocetirizine (XYZAL) 5 MG tablet SMARTSI Tablet(s) By Mouth Every Evening    loratadine (CLARITIN) 10 mg tablet Take 1 tablet every day by oral route for 30 days.    multivitamin with minerals tablet Take 1 tablet by mouth once daily.    terbinafine HCL (LAMISIL) 250 mg tablet Take 250 mg by mouth once daily.    triamcinolone acetonide 0.1% (KENALOG) 0.1 % cream APPLY THIN LAYER TOPICALLY TO THE AFFECTED AREA TWICE DAILY   Last reviewed on 2022 10:24 AM by Josefa Pastrana MD    Review of patient's allergies indicates:   Allergen Reactions    Dupixent pen [dupilumab] Other (See Comments)     The patient claims to have flu like symptoms after injection    Tramadol Itching    Celery (apium graveolens) (umbelliferae)     Chicken derived     Corn     Grass pollen- grass standard      TREE POLLEN    BRADEN GRASS     Milk      ALL DAIRY     Peanut      WALNUTS PECANS     Shrimp     Soy     Wheat     Last reviewed on  2022 10:24 AM by Josefa Pastrana      Tasks added this encounter   10/14/2022 - Refill Call (Auto Added)  2022 - Pickup Reminder   Tasks due within next 3 months   No tasks due.     Darcy Moscoso, PharmD  [facility]  [address] [place] [id number]  Phone: [phone]  Fax: [phone]

## 2022-09-23 NOTE — TELEPHONE ENCOUNTER
DOCUMENTATION ONLY:  Prior authorization for Humira approved from 9/23/2022 to 9/23/2023    Case Id: n/a    Co-pay: 0.00

## 2022-10-14 ENCOUNTER — PATIENT MESSAGE (OUTPATIENT)
Dept: PHARMACY | Facility: CLINIC | Age: 37
End: 2022-10-14
Payer: MEDICAID

## 2022-11-10 ENCOUNTER — SPECIALTY PHARMACY (OUTPATIENT)
Dept: PHARMACY | Facility: CLINIC | Age: 37
End: 2022-11-10
Payer: MEDICAID

## 2022-11-10 DIAGNOSIS — R53.83 FATIGUE, UNSPECIFIED TYPE: ICD-10-CM

## 2022-11-10 DIAGNOSIS — M45.8 ANKYLOSING SPONDYLITIS OF SACRAL REGION: ICD-10-CM

## 2022-11-10 DIAGNOSIS — D86.89 SARCOIDOSIS OF DIGESTIVE SYSTEM: ICD-10-CM

## 2022-11-10 DIAGNOSIS — M79.10 MYALGIA: ICD-10-CM

## 2022-11-10 DIAGNOSIS — D84.9 IMMUNOSUPPRESSION: ICD-10-CM

## 2022-11-10 NOTE — TELEPHONE ENCOUNTER
Outgoing call regarding Humira refill. Pt stated that he is due to inject on 11/17/22. Informed pt refill request has been sent to Provider office for approval and will follow up on 11/14/22 for status. Pt verbalized understanding.

## 2022-11-11 RX ORDER — ADALIMUMAB 40MG/0.4ML
40 KIT SUBCUTANEOUS
Qty: 2 PEN | Refills: 2 | Status: SHIPPED | OUTPATIENT
Start: 2022-11-11 | End: 2023-02-02 | Stop reason: SDUPTHER

## 2022-11-14 NOTE — TELEPHONE ENCOUNTER
Specialty Pharmacy - Refill Coordination    Specialty Medication Orders Linked to Encounter      Flowsheet Row Most Recent Value   Medication #1 adalimumab (HUMIRA,CF, PEN) 40 mg/0.4 mL PnKt (Order#[id number], Rx#[id number])            Refill Questions - Documented Responses      Flowsheet Row Most Recent Value   Patient Availability and HIPAA Verification    Does patient want to proceed with activity? Yes   HIPAA/medical authority confirmed? Yes   Relationship to patient of person spoken to? Self   Refill Screening Questions    Changes to allergies? No   Changes to medications? No   New conditions since last clinic visit? No   Unplanned office visit, urgent care, ED, or hospital admission in the last 4 weeks? No   How does patient/caregiver feel medication is working? Good   Financial problems or insurance changes? No   How many doses of your specialty medications were missed in the last 4 weeks? 0   Would patient like to speak to a pharmacist? No   When does the patient need to receive the medication? 11/17/22   Refill Delivery Questions    How will the patient receive the medication? Pickup   When does the patient need to receive the medication? 11/17/22   Address in [facility] confirmed and updated if neccessary? Yes   Expected Copay ($) 0   Is the patient able to afford the medication copay? Yes   Payment Method zero copay   Days supply of Refill 28   Supplies needed? No supplies needed   Refill activity completed? Yes   Refill activity plan Refill scheduled   Shipment/Pickup Date: 11/15/22            Current Outpatient Medications   Medication Sig    acetaminophen-codeine 300-30mg (TYLENOL #3) 300-30 mg Tab Take 1 tablet by mouth every 6 (six) hours as needed.    adalimumab (HUMIRA,CF, PEN) 40 mg/0.4 mL PnKt Inject 0.4 mL (40 mg total) into the skin every 14 (fourteen) days.    ascorbic acid, vitamin C, (VITAMIN C) 1000 MG tablet Take 1,000 mg by mouth once daily.    dicyclomine (BENTYL) 20 mg tablet  Take 20 mg by mouth 4 (four) times daily as needed.    diphenhydrAMINE (BENADRYL) 25 mg capsule Take 25 mg by mouth every 6 (six) hours as needed for Itching.    EPINEPHrine (EPIPEN) 0.3 mg/0.3 mL AtIn INJ 1 PEN SC PRN UTD    ergocalciferol (ERGOCALCIFEROL) 50,000 unit Cap Take 1 capsule every week by oral route.    fluticasone propionate (FLONASE) 50 mcg/actuation nasal spray SHAKE LQ AND U 1 SPR IEN BID    Lactobacillus rhamnosus GG (CULTURELLE) 10 billion cell capsule Take 1 capsule by mouth once daily.    levocetirizine (XYZAL) 5 MG tablet SMARTSI Tablet(s) By Mouth Every Evening    loratadine (CLARITIN) 10 mg tablet Take 1 tablet every day by oral route for 30 days.    multivitamin with minerals tablet Take 1 tablet by mouth once daily.    terbinafine HCL (LAMISIL) 250 mg tablet Take 250 mg by mouth once daily.    triamcinolone acetonide 0.1% (KENALOG) 0.1 % cream APPLY THIN LAYER TOPICALLY TO THE AFFECTED AREA TWICE DAILY   Last reviewed on 2022 10:24 AM by Josefa Pastrana MD    Review of patient's allergies indicates:   Allergen Reactions    Dupixent pen [dupilumab] Other (See Comments)     The patient claims to have flu like symptoms after injection    Tramadol Itching    Celery (apium graveolens) (umbelliferae)     Chicken derived     Corn     Grass pollen- grass standard      TREE POLLEN    BRADEN GRASS     Milk      ALL DAIRY     Peanut      WALNUTS PECANS     Shrimp     Soy     Wheat     Last reviewed on  2022 10:24 AM by Josefa Pastrana      Tasks added this encounter   2022 - Refill Call (Auto Added)  2022 - Pickup Reminder   Tasks due within next 3 months   No tasks due.     Dayana Dsouza, Patient Care Assistant  [facility]  [address] [place] [id number]  Phone: [phone]  Fax: [phone]

## 2022-11-18 ENCOUNTER — PATIENT MESSAGE (OUTPATIENT)
Dept: RHEUMATOLOGY | Facility: CLINIC | Age: 37
End: 2022-11-18
Payer: MEDICAID

## 2022-11-18 ENCOUNTER — LAB VISIT (OUTPATIENT)
Dept: LAB | Facility: HOSPITAL | Age: 37
End: 2022-11-18
Attending: INTERNAL MEDICINE
Payer: MEDICAID

## 2022-11-18 DIAGNOSIS — R53.83 FATIGUE, UNSPECIFIED TYPE: ICD-10-CM

## 2022-11-18 DIAGNOSIS — M45.8 ANKYLOSING SPONDYLITIS OF SACRAL REGION: ICD-10-CM

## 2022-11-18 DIAGNOSIS — D84.9 IMMUNOSUPPRESSION: ICD-10-CM

## 2022-11-18 LAB
ALBUMIN SERPL BCP-MCNC: 4.1 G/DL (ref 3.5–5.2)
ALP SERPL-CCNC: 81 U/L (ref 55–135)
ALT SERPL W/O P-5'-P-CCNC: 12 U/L (ref 10–44)
ANION GAP SERPL CALC-SCNC: 9 MMOL/L (ref 8–16)
AST SERPL-CCNC: 17 U/L (ref 10–40)
BASOPHILS # BLD AUTO: 0.05 K/UL (ref 0–0.2)
BASOPHILS NFR BLD: 1.1 % (ref 0–1.9)
BILIRUB SERPL-MCNC: 0.8 MG/DL (ref 0.1–1)
BUN SERPL-MCNC: 6 MG/DL (ref 6–20)
CALCIUM SERPL-MCNC: 9.5 MG/DL (ref 8.7–10.5)
CHLORIDE SERPL-SCNC: 105 MMOL/L (ref 95–110)
CO2 SERPL-SCNC: 27 MMOL/L (ref 23–29)
CREAT SERPL-MCNC: 0.8 MG/DL (ref 0.5–1.4)
CRP SERPL-MCNC: 0.6 MG/L (ref 0–8.2)
DIFFERENTIAL METHOD: ABNORMAL
EOSINOPHIL # BLD AUTO: 0.4 K/UL (ref 0–0.5)
EOSINOPHIL NFR BLD: 8.1 % (ref 0–8)
ERYTHROCYTE [DISTWIDTH] IN BLOOD BY AUTOMATED COUNT: 13.8 % (ref 11.5–14.5)
ERYTHROCYTE [SEDIMENTATION RATE] IN BLOOD BY PHOTOMETRIC METHOD: 5 MM/HR (ref 0–23)
EST. GFR  (NO RACE VARIABLE): >60 ML/MIN/1.73 M^2
GLUCOSE SERPL-MCNC: 103 MG/DL (ref 70–110)
HBV CORE AB SERPL QL IA: NORMAL
HBV SURFACE AB SER-ACNC: 33.73 MIU/ML
HBV SURFACE AB SER-ACNC: REACTIVE M[IU]/ML
HBV SURFACE AG SERPL QL IA: NORMAL
HCT VFR BLD AUTO: 45 % (ref 40–54)
HCV AB SERPL QL IA: NORMAL
HGB BLD-MCNC: 14.8 G/DL (ref 14–18)
IMM GRANULOCYTES # BLD AUTO: 0 K/UL (ref 0–0.04)
IMM GRANULOCYTES NFR BLD AUTO: 0 % (ref 0–0.5)
LYMPHOCYTES # BLD AUTO: 2.9 K/UL (ref 1–4.8)
LYMPHOCYTES NFR BLD: 62.4 % (ref 18–48)
MCH RBC QN AUTO: 29.3 PG (ref 27–31)
MCHC RBC AUTO-ENTMCNC: 32.9 G/DL (ref 32–36)
MCV RBC AUTO: 89 FL (ref 82–98)
MONOCYTES # BLD AUTO: 0.5 K/UL (ref 0.3–1)
MONOCYTES NFR BLD: 9.8 % (ref 4–15)
NEUTROPHILS # BLD AUTO: 0.9 K/UL (ref 1.8–7.7)
NEUTROPHILS NFR BLD: 18.6 % (ref 38–73)
NRBC BLD-RTO: 0 /100 WBC
PLATELET # BLD AUTO: 292 K/UL (ref 150–450)
PMV BLD AUTO: 10.4 FL (ref 9.2–12.9)
POTASSIUM SERPL-SCNC: 4.2 MMOL/L (ref 3.5–5.1)
PROT SERPL-MCNC: 7.4 G/DL (ref 6–8.4)
RBC # BLD AUTO: 5.05 M/UL (ref 4.6–6.2)
SODIUM SERPL-SCNC: 141 MMOL/L (ref 136–145)
WBC # BLD AUTO: 4.71 K/UL (ref 3.9–12.7)

## 2022-11-18 PROCEDURE — 85025 COMPLETE CBC W/AUTO DIFF WBC: CPT | Performed by: INTERNAL MEDICINE

## 2022-11-18 PROCEDURE — 80053 COMPREHEN METABOLIC PANEL: CPT | Performed by: INTERNAL MEDICINE

## 2022-11-18 PROCEDURE — 86140 C-REACTIVE PROTEIN: CPT | Performed by: INTERNAL MEDICINE

## 2022-11-18 PROCEDURE — 87340 HEPATITIS B SURFACE AG IA: CPT | Performed by: INTERNAL MEDICINE

## 2022-11-18 PROCEDURE — 86803 HEPATITIS C AB TEST: CPT | Performed by: INTERNAL MEDICINE

## 2022-11-18 PROCEDURE — 36415 COLL VENOUS BLD VENIPUNCTURE: CPT | Performed by: INTERNAL MEDICINE

## 2022-11-18 PROCEDURE — 86704 HEP B CORE ANTIBODY TOTAL: CPT | Performed by: INTERNAL MEDICINE

## 2022-11-18 PROCEDURE — 86706 HEP B SURFACE ANTIBODY: CPT | Mod: 91 | Performed by: INTERNAL MEDICINE

## 2022-11-18 PROCEDURE — 85652 RBC SED RATE AUTOMATED: CPT | Performed by: INTERNAL MEDICINE

## 2022-12-08 ENCOUNTER — SPECIALTY PHARMACY (OUTPATIENT)
Dept: PHARMACY | Facility: CLINIC | Age: 37
End: 2022-12-08
Payer: MEDICAID

## 2022-12-08 NOTE — TELEPHONE ENCOUNTER
Specialty Pharmacy - Refill Coordination    Specialty Medication Orders Linked to Encounter      Flowsheet Row Most Recent Value   Medication #1 adalimumab (HUMIRA,CF, PEN) 40 mg/0.4 mL PnKt (Order#[id number], Rx#[id number])            Refill Questions - Documented Responses      Flowsheet Row Most Recent Value   Patient Availability and HIPAA Verification    Does patient want to proceed with activity? Yes   HIPAA/medical authority confirmed? Yes   Relationship to patient of person spoken to? Self   Refill Screening Questions    Changes to allergies? No   Changes to medications? No   New conditions since last clinic visit? No   Unplanned office visit, urgent care, ED, or hospital admission in the last 4 weeks? No   How does patient/caregiver feel medication is working? Good   Financial problems or insurance changes? No   How many doses of your specialty medications were missed in the last 4 weeks? 0   Would patient like to speak to a pharmacist? No   When does the patient need to receive the medication? 12/15/22   Refill Delivery Questions    How will the patient receive the medication? Pickup   When does the patient need to receive the medication? 12/15/22   Expected Copay ($) 0   Is the patient able to afford the medication copay? Yes   Payment Method zero copay   Days supply of Refill 28   Supplies needed? No supplies needed   Refill activity plan Refill scheduled   Shipment/Pickup Date: 12/13/22            Current Outpatient Medications   Medication Sig    acetaminophen-codeine 300-30mg (TYLENOL #3) 300-30 mg Tab Take 1 tablet by mouth every 6 (six) hours as needed.    adalimumab (HUMIRA,CF, PEN) 40 mg/0.4 mL PnKt Inject 0.4 mL (40 mg total) into the skin every 14 (fourteen) days.    ascorbic acid, vitamin C, (VITAMIN C) 1000 MG tablet Take 1,000 mg by mouth once daily.    dicyclomine (BENTYL) 20 mg tablet Take 20 mg by mouth 4 (four) times daily as needed.    diphenhydrAMINE (BENADRYL) 25 mg capsule Take 25 mg  by mouth every 6 (six) hours as needed for Itching.    EPINEPHrine (EPIPEN) 0.3 mg/0.3 mL AtIn INJ 1 PEN SC PRN UTD    ergocalciferol (ERGOCALCIFEROL) 50,000 unit Cap Take 1 capsule every week by oral route.    fluticasone propionate (FLONASE) 50 mcg/actuation nasal spray SHAKE LQ AND U 1 SPR IEN BID    Lactobacillus rhamnosus GG (CULTURELLE) 10 billion cell capsule Take 1 capsule by mouth once daily.    levocetirizine (XYZAL) 5 MG tablet SMARTSI Tablet(s) By Mouth Every Evening    loratadine (CLARITIN) 10 mg tablet Take 1 tablet every day by oral route for 30 days.    multivitamin with minerals tablet Take 1 tablet by mouth once daily.    terbinafine HCL (LAMISIL) 250 mg tablet Take 250 mg by mouth once daily.    triamcinolone acetonide 0.1% (KENALOG) 0.1 % cream APPLY THIN LAYER TOPICALLY TO THE AFFECTED AREA TWICE DAILY   Last reviewed on 2022 10:24 AM by Josefa Pastrana MD    Review of patient's allergies indicates:   Allergen Reactions    Dupixent pen [dupilumab] Other (See Comments)     The patient claims to have flu like symptoms after injection    Tramadol Itching    Celery (apium graveolens) (umbelliferae)     Chicken derived     Corn     Grass pollen- grass standard      TREE POLLEN    BRADEN GRASS     Milk      ALL DAIRY     Peanut      WALNUTS PECANS     Shrimp     Soy     Wheat     Last reviewed on  2022 10:24 AM by Josefa Pastrana      Tasks added this encounter   2023 - Refill Call (Auto Added)  2022 - Pickup Reminder   Tasks due within next 3 months   3/1/2023 - Clinical - Follow Up Assesement (Annual)     [facility]  [address] [place], [place] [address]  [facility] [address]  Phone: [phone]  Fax: [phone]

## 2023-01-05 ENCOUNTER — SPECIALTY PHARMACY (OUTPATIENT)
Dept: PHARMACY | Facility: CLINIC | Age: 38
End: 2023-01-05
Payer: MEDICAID

## 2023-01-05 NOTE — TELEPHONE ENCOUNTER
Specialty Pharmacy - Refill Coordination    Specialty Medication Orders Linked to Encounter      Flowsheet Row Most Recent Value   Medication #1 adalimumab (HUMIRA,CF, PEN) 40 mg/0.4 mL PnKt (Order#[id number], Rx#[id number])            Refill Questions - Documented Responses      Flowsheet Row Most Recent Value   Patient Availability and HIPAA Verification    Does patient want to proceed with activity? Yes   HIPAA/medical authority confirmed? Yes   Relationship to patient of person spoken to? Self   Refill Screening Questions    Changes to allergies? No   Changes to medications? No   New conditions since last clinic visit? No   Unplanned office visit, urgent care, ED, or hospital admission in the last 4 weeks? No   How does patient/caregiver feel medication is working? Good   Financial problems or insurance changes? No   How many doses of your specialty medications were missed in the last 4 weeks? 0   Would patient like to speak to a pharmacist? No   When does the patient need to receive the medication? 01/12/23   Refill Delivery Questions    How will the patient receive the medication? Pickup   When does the patient need to receive the medication? 01/12/23   Address in [facility] confirmed and updated if neccessary? Yes   Expected Copay ($) 0   Is the patient able to afford the medication copay? Yes   Payment Method zero copay   Days supply of Refill 28   Supplies needed? No supplies needed   Refill activity completed? Yes   Refill activity plan Refill scheduled   Shipment/Pickup Date: 01/10/23            Current Outpatient Medications   Medication Sig    acetaminophen-codeine 300-30mg (TYLENOL #3) 300-30 mg Tab Take 1 tablet by mouth every 6 (six) hours as needed.    adalimumab (HUMIRA,CF, PEN) 40 mg/0.4 mL PnKt Inject 0.4 mL (40 mg total) into the skin every 14 (fourteen) days.    ascorbic acid, vitamin C, (VITAMIN C) 1000 MG tablet Take 1,000 mg by mouth once daily.    dicyclomine (BENTYL) 20 mg tablet  Take 20 mg by mouth 4 (four) times daily as needed.    diphenhydrAMINE (BENADRYL) 25 mg capsule Take 25 mg by mouth every 6 (six) hours as needed for Itching.    EPINEPHrine (EPIPEN) 0.3 mg/0.3 mL AtIn INJ 1 PEN SC PRN UTD    ergocalciferol (ERGOCALCIFEROL) 50,000 unit Cap Take 1 capsule every week by oral route.    fluticasone propionate (FLONASE) 50 mcg/actuation nasal spray SHAKE LQ AND U 1 SPR IEN BID    Lactobacillus rhamnosus GG (CULTURELLE) 10 billion cell capsule Take 1 capsule by mouth once daily.    levocetirizine (XYZAL) 5 MG tablet SMARTSI Tablet(s) By Mouth Every Evening    loratadine (CLARITIN) 10 mg tablet Take 1 tablet every day by oral route for 30 days.    multivitamin with minerals tablet Take 1 tablet by mouth once daily.    terbinafine HCL (LAMISIL) 250 mg tablet Take 250 mg by mouth once daily.    triamcinolone acetonide 0.1% (KENALOG) 0.1 % cream APPLY THIN LAYER TOPICALLY TO THE AFFECTED AREA TWICE DAILY   Last reviewed on 2022 10:24 AM by Josefa Pastrana MD    Review of patient's allergies indicates:   Allergen Reactions    Dupixent pen [dupilumab] Other (See Comments)     The patient claims to have flu like symptoms after injection    Tramadol Itching    Celery (apium graveolens) (umbelliferae)     Chicken derived     Corn     Grass pollen- grass standard      TREE POLLEN    BRADEN GRASS     Milk      ALL DAIRY     Peanut      WALNUTS PECANS     Shrimp     Soy     Wheat     Last reviewed on  2022 10:24 AM by Josefa Pastrana      Tasks added this encounter   2023 - Refill Call (Auto Added)  2023 - Pickup Reminder   Tasks due within next 3 months   3/1/2023 - Clinical - Follow Up Assesement (Annual)     Dayana Dsouza, Patient Care Assistant  [facility]  [address]  [facility] [address]  Phone: [phone]  Fax: [phone]

## 2023-02-02 ENCOUNTER — SPECIALTY PHARMACY (OUTPATIENT)
Dept: PHARMACY | Facility: CLINIC | Age: 38
End: 2023-02-02
Payer: MEDICAID

## 2023-02-02 DIAGNOSIS — R53.83 FATIGUE, UNSPECIFIED TYPE: ICD-10-CM

## 2023-02-02 DIAGNOSIS — M79.10 MYALGIA: ICD-10-CM

## 2023-02-02 DIAGNOSIS — D86.89 SARCOIDOSIS OF DIGESTIVE SYSTEM: ICD-10-CM

## 2023-02-02 DIAGNOSIS — D84.9 IMMUNOSUPPRESSION: ICD-10-CM

## 2023-02-02 DIAGNOSIS — M45.8 ANKYLOSING SPONDYLITIS OF SACRAL REGION: ICD-10-CM

## 2023-02-02 NOTE — TELEPHONE ENCOUNTER
Outgoing call to patient for humira refill -- next dose due 2/9. Refill request sent to MD and will follow up once approved

## 2023-02-03 RX ORDER — ADALIMUMAB 40MG/0.4ML
40 KIT SUBCUTANEOUS
Qty: 2 PEN | Refills: 2 | Status: SHIPPED | OUTPATIENT
Start: 2023-02-03 | End: 2023-04-27 | Stop reason: SDUPTHER

## 2023-02-06 ENCOUNTER — PATIENT MESSAGE (OUTPATIENT)
Dept: PHARMACY | Facility: CLINIC | Age: 38
End: 2023-02-06
Payer: MEDICAID

## 2023-02-08 NOTE — TELEPHONE ENCOUNTER
Specialty Pharmacy - Refill Coordination    Specialty Medication Orders Linked to Encounter      Flowsheet Row Most Recent Value   Medication #1 adalimumab (HUMIRA,CF, PEN) 40 mg/0.4 mL PnKt (Order#[id number], Rx#[id number])            Refill Questions - Documented Responses      Flowsheet Row Most Recent Value   Patient Availability and HIPAA Verification    Does patient want to proceed with activity? Yes   HIPAA/medical authority confirmed? Yes   Relationship to patient of person spoken to? Self   Refill Screening Questions    Changes to allergies? No   Changes to medications? No   New conditions since last clinic visit? No   Unplanned office visit, urgent care, ED, or hospital admission in the last 4 weeks? No   How does patient/caregiver feel medication is working? Good   Financial problems or insurance changes? No   How many doses of your specialty medications were missed in the last 4 weeks? 0   Would patient like to speak to a pharmacist? No   When does the patient need to receive the medication? 02/09/23   Refill Delivery Questions    How will the patient receive the medication? Pickup   When does the patient need to receive the medication? 02/09/23   Expected Copay ($) 0   Is the patient able to afford the medication copay? Yes   Payment Method zero copay   Days supply of Refill 28   Supplies needed? No supplies needed   Refill activity completed? Yes   Refill activity plan Refill scheduled   Shipment/Pickup Date: 02/09/23            Current Outpatient Medications   Medication Sig    acetaminophen-codeine 300-30mg (TYLENOL #3) 300-30 mg Tab Take 1 tablet by mouth every 6 (six) hours as needed.    adalimumab (HUMIRA,CF, PEN) 40 mg/0.4 mL PnKt Inject 0.4 mL (40 mg total) into the skin every 14 (fourteen) days.    ascorbic acid, vitamin C, (VITAMIN C) 1000 MG tablet Take 1,000 mg by mouth once daily.    dicyclomine (BENTYL) 20 mg tablet Take 20 mg by mouth 4 (four) times daily as needed.    diphenhydrAMINE  (BENADRYL) 25 mg capsule Take 25 mg by mouth every 6 (six) hours as needed for Itching.    EPINEPHrine (EPIPEN) 0.3 mg/0.3 mL AtIn INJ 1 PEN SC PRN UTD    ergocalciferol (ERGOCALCIFEROL) 50,000 unit Cap Take 1 capsule every week by oral route.    fluticasone propionate (FLONASE) 50 mcg/actuation nasal spray SHAKE LQ AND U 1 SPR IEN BID    Lactobacillus rhamnosus GG (CULTURELLE) 10 billion cell capsule Take 1 capsule by mouth once daily.    levocetirizine (XYZAL) 5 MG tablet SMARTSI Tablet(s) By Mouth Every Evening    loratadine (CLARITIN) 10 mg tablet Take 1 tablet every day by oral route for 30 days.    multivitamin with minerals tablet Take 1 tablet by mouth once daily.    terbinafine HCL (LAMISIL) 250 mg tablet Take 250 mg by mouth once daily.    triamcinolone acetonide 0.1% (KENALOG) 0.1 % cream APPLY THIN LAYER TOPICALLY TO THE AFFECTED AREA TWICE DAILY   Last reviewed on 2022 10:24 AM by Josefa Pastrana MD    Review of patient's allergies indicates:   Allergen Reactions    Dupixent pen [dupilumab] Other (See Comments)     The patient claims to have flu like symptoms after injection    Tramadol Itching    Celery (apium graveolens) (umbelliferae)     Chicken derived     Corn     Grass pollen- grass standard      TREE POLLEN    BRADEN GRASS     Milk      ALL DAIRY     Peanut      WALNUTS PECANS     Shrimp     Soy     Wheat     Last reviewed on  2022 10:24 AM by Josefa Pastrana      Tasks added this encounter   3/2/2023 - Refill Call (Auto Added)  2/10/2023 - Pickup Reminder   Tasks due within next 3 months   3/1/2023 - Clinical - Follow Up Assesement (Annual)     Krish Macias, PharmD  [facility]  [address] [place] [address]  Phone: [phone]  Fax: [phone]

## 2023-02-10 ENCOUNTER — LAB VISIT (OUTPATIENT)
Dept: LAB | Facility: HOSPITAL | Age: 38
End: 2023-02-10
Attending: INTERNAL MEDICINE
Payer: MEDICAID

## 2023-02-10 DIAGNOSIS — D84.9 IMMUNOSUPPRESSION: ICD-10-CM

## 2023-02-10 DIAGNOSIS — R53.83 FATIGUE, UNSPECIFIED TYPE: ICD-10-CM

## 2023-02-10 DIAGNOSIS — M45.8 ANKYLOSING SPONDYLITIS OF SACRAL REGION: ICD-10-CM

## 2023-02-10 LAB
ALBUMIN SERPL BCP-MCNC: 4.3 G/DL (ref 3.5–5.2)
ALP SERPL-CCNC: 81 U/L (ref 55–135)
ALT SERPL W/O P-5'-P-CCNC: 11 U/L (ref 10–44)
ANION GAP SERPL CALC-SCNC: 12 MMOL/L (ref 8–16)
AST SERPL-CCNC: 19 U/L (ref 10–40)
BASOPHILS # BLD AUTO: 0.06 K/UL (ref 0–0.2)
BASOPHILS NFR BLD: 1.1 % (ref 0–1.9)
BILIRUB SERPL-MCNC: 0.6 MG/DL (ref 0.1–1)
BUN SERPL-MCNC: 5 MG/DL (ref 6–20)
CALCIUM SERPL-MCNC: 9.4 MG/DL (ref 8.7–10.5)
CHLORIDE SERPL-SCNC: 102 MMOL/L (ref 95–110)
CO2 SERPL-SCNC: 24 MMOL/L (ref 23–29)
CREAT SERPL-MCNC: 0.8 MG/DL (ref 0.5–1.4)
CRP SERPL-MCNC: 1.6 MG/L (ref 0–8.2)
DIFFERENTIAL METHOD: ABNORMAL
EOSINOPHIL # BLD AUTO: 0.4 K/UL (ref 0–0.5)
EOSINOPHIL NFR BLD: 7 % (ref 0–8)
ERYTHROCYTE [DISTWIDTH] IN BLOOD BY AUTOMATED COUNT: 13.5 % (ref 11.5–14.5)
ERYTHROCYTE [SEDIMENTATION RATE] IN BLOOD BY PHOTOMETRIC METHOD: 11 MM/HR (ref 0–23)
EST. GFR  (NO RACE VARIABLE): >60 ML/MIN/1.73 M^2
GLUCOSE SERPL-MCNC: 84 MG/DL (ref 70–110)
HCT VFR BLD AUTO: 46.6 % (ref 40–54)
HGB BLD-MCNC: 15.2 G/DL (ref 14–18)
IMM GRANULOCYTES # BLD AUTO: 0 K/UL (ref 0–0.04)
IMM GRANULOCYTES NFR BLD AUTO: 0 % (ref 0–0.5)
LYMPHOCYTES # BLD AUTO: 3.4 K/UL (ref 1–4.8)
LYMPHOCYTES NFR BLD: 63 % (ref 18–48)
MCH RBC QN AUTO: 29 PG (ref 27–31)
MCHC RBC AUTO-ENTMCNC: 32.6 G/DL (ref 32–36)
MCV RBC AUTO: 89 FL (ref 82–98)
MONOCYTES # BLD AUTO: 0.5 K/UL (ref 0.3–1)
MONOCYTES NFR BLD: 9.8 % (ref 4–15)
NEUTROPHILS # BLD AUTO: 1 K/UL (ref 1.8–7.7)
NEUTROPHILS NFR BLD: 19.1 % (ref 38–73)
NRBC BLD-RTO: 0 /100 WBC
PLATELET # BLD AUTO: 327 K/UL (ref 150–450)
PMV BLD AUTO: 10.7 FL (ref 9.2–12.9)
POTASSIUM SERPL-SCNC: 4.4 MMOL/L (ref 3.5–5.1)
PROT SERPL-MCNC: 7.3 G/DL (ref 6–8.4)
RBC # BLD AUTO: 5.24 M/UL (ref 4.6–6.2)
SODIUM SERPL-SCNC: 138 MMOL/L (ref 136–145)
WBC # BLD AUTO: 5.4 K/UL (ref 3.9–12.7)

## 2023-02-10 PROCEDURE — 85025 COMPLETE CBC W/AUTO DIFF WBC: CPT | Performed by: INTERNAL MEDICINE

## 2023-02-10 PROCEDURE — 80053 COMPREHEN METABOLIC PANEL: CPT | Performed by: INTERNAL MEDICINE

## 2023-02-10 PROCEDURE — 36415 COLL VENOUS BLD VENIPUNCTURE: CPT | Performed by: INTERNAL MEDICINE

## 2023-02-10 PROCEDURE — 85652 RBC SED RATE AUTOMATED: CPT | Performed by: INTERNAL MEDICINE

## 2023-02-10 PROCEDURE — 86140 C-REACTIVE PROTEIN: CPT | Performed by: INTERNAL MEDICINE

## 2023-02-13 ENCOUNTER — PATIENT MESSAGE (OUTPATIENT)
Dept: RHEUMATOLOGY | Facility: CLINIC | Age: 38
End: 2023-02-13
Payer: MEDICAID

## 2023-03-02 ENCOUNTER — SPECIALTY PHARMACY (OUTPATIENT)
Dept: PHARMACY | Facility: CLINIC | Age: 38
End: 2023-03-02
Payer: MEDICAID

## 2023-03-03 NOTE — TELEPHONE ENCOUNTER
Specialty Pharmacy - Clinical Reassessment  Specialty Pharmacy - Refill Coordination    Specialty Medication Orders Linked to Encounter      Flowsheet Row Most Recent Value   Medication #1 adalimumab (HUMIRA,CF, PEN) 40 mg/0.4 mL PnKt (Order#[id number], Rx#[id number])            Refill Questions - Documented Responses      Flowsheet Row Most Recent Value   Refill Screening Questions    Changes to allergies? No   Changes to medications? No   New conditions since last clinic visit? No   Unplanned office visit, urgent care, ED, or hospital admission in the last 4 weeks? No   How does patient/caregiver feel medication is working? Very good   Financial problems or insurance changes? No   How many doses of your specialty medications were missed in the last 4 weeks? 0   Would patient like to speak to a pharmacist? No   When does the patient need to receive the medication? 03/09/23   Refill Delivery Questions    How will the patient receive the medication? Pickup   When does the patient need to receive the medication? 03/09/23   Shipping Address Home   Address in [facility] confirmed and updated if neccessary? Yes   Expected Copay ($) 0   Is the patient able to afford the medication copay? Yes   Payment Method zero copay   Days supply of Refill 28   Supplies needed? No supplies needed   Refill activity completed? Yes   Refill activity plan Refill scheduled   Shipment/Pickup Date: 03/06/23            Current Outpatient Medications   Medication Sig    acetaminophen-codeine 300-30mg (TYLENOL #3) 300-30 mg Tab Take 1 tablet by mouth every 6 (six) hours as needed.    adalimumab (HUMIRA,CF, PEN) 40 mg/0.4 mL PnKt Inject 0.4 mL (40 mg total) into the skin every 14 (fourteen) days.    ascorbic acid, vitamin C, (VITAMIN C) 1000 MG tablet Take 1,000 mg by mouth once daily.    dicyclomine (BENTYL) 20 mg tablet Take 20 mg by mouth 4 (four) times daily as needed.    diphenhydrAMINE (BENADRYL) 25 mg capsule Take 25 mg by mouth  every 6 (six) hours as needed for Itching.    EPINEPHrine (EPIPEN) 0.3 mg/0.3 mL AtIn INJ 1 PEN SC PRN UTD    ergocalciferol (ERGOCALCIFEROL) 50,000 unit Cap Take 1 capsule every week by oral route.    fluticasone propionate (FLONASE) 50 mcg/actuation nasal spray SHAKE LQ AND U 1 SPR IEN BID    Lactobacillus rhamnosus GG (CULTURELLE) 10 billion cell capsule Take 1 capsule by mouth once daily.    levocetirizine (XYZAL) 5 MG tablet SMARTSI Tablet(s) By Mouth Every Evening    loratadine (CLARITIN) 10 mg tablet Take 1 tablet every day by oral route for 30 days.    multivitamin with minerals tablet Take 1 tablet by mouth once daily.    terbinafine HCL (LAMISIL) 250 mg tablet Take 250 mg by mouth once daily.    triamcinolone acetonide 0.1% (KENALOG) 0.1 % cream APPLY THIN LAYER TOPICALLY TO THE AFFECTED AREA TWICE DAILY   Last reviewed on 2022 10:24 AM by Josefa Pastrana MD    Review of patient's allergies indicates:   Allergen Reactions    Dupixent pen [dupilumab] Other (See Comments)     The patient claims to have flu like symptoms after injection    Tramadol Itching    Celery (apium graveolens) (umbelliferae)     Chicken derived     Corn     Grass pollen- grass standard      TREE POLLEN    BRADEN GRASS     Milk      ALL DAIRY     Peanut      WALNUTS PECANS     Shrimp     Soy     Wheat     Last reviewed on  2022 10:24 AM by Josefa Pastrana      Tasks added this encounter   No tasks added.   Tasks due within next 3 months   3/1/2023 - Clinical - Follow Up Assesement (Annual)  3/3/2023 - Refill Call (Auto Added)     Darcy Basilio, PharmD  [facility]  [address] [place] [id number]  Phone: [phone]  Fax: [phone]

## 2023-03-03 NOTE — TELEPHONE ENCOUNTER
Specialty Pharmacy - Clinical Reassessment    Specialty Medication Orders Linked to Encounter      Flowsheet Row Most Recent Value   Medication #1 adalimumab (HUMIRA,CF, PEN) 40 mg/0.4 mL PnKt (Order#[id number], Rx#[id number])          Patient Diagnosis   M45.8 - Ankylosing spondylitis of sacral region    Darshana Flor is a 37 y.o. male, who is followed by the specialty pharmacy service for management and education of his Humira.  He has been on therapy with Humira for 2 years.  I have reviewed his electronic medical record and current medication list and determined that specialty medication adjustment Is not needed at this time.    Patient has not experienced adverse events.  He Is adherent reporting 0 missed doses since last review.  Adherence has been encouraged with the following mechanism(s): proactive refill calls, calendar.  He is meeting goals of therapy and will continue treatment.        2/8/2023 1/5/2023 12/8/2022 11/14/2022 10/19/2022 9/23/2022 8/18/2022   Follow Up Review   # of missed doses 0 0 0 0 0 0 0   New Medications? No No No No No No No   New Conditions? No No No No No No No   New Allergies? No No No No No No No   Med Effective? Good Good Good Good Good Very good Good   Urgent Care? No No No No No No No   Requested Pharmacist? No No No No No No No            Therapy is appropriate to continue.    Therapy is effective: Yes  On scale of 1 to 10, how does patient rank quality of life? (10 - Best): 8  Recommendations: none at this time.  Review Method: Patient Contact    Tasks added this encounter   No tasks added.   Tasks due within next 3 months   3/1/2023 - Clinical - Follow Up Assesement (Annual)  3/3/2023 - Refill Call (Auto Added)     Darcy Basilio, PharmD  [facility]  [address] [facility] [address]  Phone: [phone]  Fax: [phone]

## 2023-03-14 ENCOUNTER — OFFICE VISIT (OUTPATIENT)
Dept: RHEUMATOLOGY | Facility: CLINIC | Age: 38
End: 2023-03-14
Payer: MEDICAID

## 2023-03-14 VITALS
DIASTOLIC BLOOD PRESSURE: 77 MMHG | WEIGHT: 192.88 LBS | BODY MASS INDEX: 26.12 KG/M2 | HEIGHT: 72 IN | HEART RATE: 70 BPM | SYSTOLIC BLOOD PRESSURE: 121 MMHG

## 2023-03-14 DIAGNOSIS — M45.8 ANKYLOSING SPONDYLITIS OF SACRAL REGION: Primary | ICD-10-CM

## 2023-03-14 DIAGNOSIS — D86.89 SARCOIDOSIS OF DIGESTIVE SYSTEM: ICD-10-CM

## 2023-03-14 DIAGNOSIS — R53.83 FATIGUE, UNSPECIFIED TYPE: ICD-10-CM

## 2023-03-14 DIAGNOSIS — D84.9 IMMUNOSUPPRESSION: ICD-10-CM

## 2023-03-14 PROCEDURE — 99213 OFFICE O/P EST LOW 20 MIN: CPT | Mod: PBBFAC | Performed by: INTERNAL MEDICINE

## 2023-03-14 PROCEDURE — 3008F BODY MASS INDEX DOCD: CPT | Mod: CPTII,,, | Performed by: INTERNAL MEDICINE

## 2023-03-14 PROCEDURE — 99214 OFFICE O/P EST MOD 30 MIN: CPT | Mod: S$PBB,,, | Performed by: INTERNAL MEDICINE

## 2023-03-14 PROCEDURE — 1159F PR MEDICATION LIST DOCUMENTED IN MEDICAL RECORD: ICD-10-PCS | Mod: CPTII,,, | Performed by: INTERNAL MEDICINE

## 2023-03-14 PROCEDURE — 3078F PR MOST RECENT DIASTOLIC BLOOD PRESSURE < 80 MM HG: ICD-10-PCS | Mod: CPTII,,, | Performed by: INTERNAL MEDICINE

## 2023-03-14 PROCEDURE — 3078F DIAST BP <80 MM HG: CPT | Mod: CPTII,,, | Performed by: INTERNAL MEDICINE

## 2023-03-14 PROCEDURE — 3074F PR MOST RECENT SYSTOLIC BLOOD PRESSURE < 130 MM HG: ICD-10-PCS | Mod: CPTII,,, | Performed by: INTERNAL MEDICINE

## 2023-03-14 PROCEDURE — 99999 PR PBB SHADOW E&M-EST. PATIENT-LVL III: ICD-10-PCS | Mod: PBBFAC,,, | Performed by: INTERNAL MEDICINE

## 2023-03-14 PROCEDURE — 1159F MED LIST DOCD IN RCRD: CPT | Mod: CPTII,,, | Performed by: INTERNAL MEDICINE

## 2023-03-14 PROCEDURE — 3008F PR BODY MASS INDEX (BMI) DOCUMENTED: ICD-10-PCS | Mod: CPTII,,, | Performed by: INTERNAL MEDICINE

## 2023-03-14 PROCEDURE — 3074F SYST BP LT 130 MM HG: CPT | Mod: CPTII,,, | Performed by: INTERNAL MEDICINE

## 2023-03-14 PROCEDURE — 99999 PR PBB SHADOW E&M-EST. PATIENT-LVL III: CPT | Mod: PBBFAC,,, | Performed by: INTERNAL MEDICINE

## 2023-03-14 PROCEDURE — 99214 PR OFFICE/OUTPT VISIT, EST, LEVL IV, 30-39 MIN: ICD-10-PCS | Mod: S$PBB,,, | Performed by: INTERNAL MEDICINE

## 2023-03-14 PROCEDURE — 1160F RVW MEDS BY RX/DR IN RCRD: CPT | Mod: CPTII,,, | Performed by: INTERNAL MEDICINE

## 2023-03-14 PROCEDURE — 1160F PR REVIEW ALL MEDS BY PRESCRIBER/CLIN PHARMACIST DOCUMENTED: ICD-10-PCS | Mod: CPTII,,, | Performed by: INTERNAL MEDICINE

## 2023-03-14 RX ORDER — BUDESONIDE 0.5 MG/2ML
INHALANT ORAL
COMMUNITY
Start: 2023-02-22 | End: 2024-03-11

## 2023-03-14 NOTE — PROGRESS NOTES
"Subjective:       Patient ID: Darshana Flor is a 37 y.o. male.    Chief Complaint: ankylosing spondylitis    HPI:  Darshana Flor is a 37 y.o. male with history of asthma diagnosed with sarcoidosis based on colonic biopsy with noncaseating granulomas.  Consultation for sarcoidosis from primary.  In 2018 had multiple episodes of bloody diarrhea.  Urgent care diagnosed hemorrhoids.  In 2020 evaluated by GI and had 2 colonoscopy.  Colonoscopy biopsy showed non caseating granulomas in the ileum and colon.  Lab showed positive Saccharomyces cerevisae IgG and IgM suggestive of Crohns.    Referred to pulmonary Dr. Shepherd who evaluated patient with x-ray, PFTs and CT chest.  No changes of sarcoid noted but pulmonary suspects CP.  Since 2017 has had a "knot" in his chest that causes intermittent CP worse with leaning back or breathing in.  Improves with steroid from primary.  Denies dysphagia or dynophagia.  Swallowing study normal.   The chronic chest pain felt to be musculoskeletal and not cardiac after cardiology work up.  Pain in muscles and joints shoulders, lower back, hands and hips.  Pain up to 10/10 ache intermittent.  Sudden stiffness in back occurs.   Awakens him from sleep.  Improves with steroids.   Morning stiffness for all day at times.   Pain awakens him from sleep.  Alternating buttock pain.  Exercise helps pain.   Not able to get in car due to pain.    Sudden onset lower extremity weakness that occurs every few month since 20s and he will fall.     Lupus Review of Systems  Alopecia: no  Photosensitivity: no   Raynaud's: no  Oral or nasal ulcers: no  Rashes: no  No pleurisy or pericarditis. (now with pleuritic CP)  No seizures, psychosis, or stroke.  No venous or arterial clots.  Pregnancy hx (if applicable): N/A      INTERVAL HISTORY:    Stopped smoking and eating seafood.   Did a 28 day detox (only eating fruits and vegetables and only drank water).  Energy is up now.  Left knee pain less mostly " with cold weather.   Not stretching like he used to.      Had sinus surgery which has helped.  He does nasal rinse and nasal steroid.     He stopped allergy shots since did not help.   Stopped Dupixent due to allergic.       Allergist Dr. Cris Lima at [facility] .      Currently no pain.  No morning stiffness.       Review of Systems   Constitutional:  Negative for appetite change, chills, fever and unexpected weight change.   HENT:  Negative for mouth sores and trouble swallowing.         Allergies   Eyes:  Negative for redness.   Respiratory: Negative.  Negative for cough and shortness of breath.    Cardiovascular: Negative.  Negative for chest pain.   Gastrointestinal: Negative.  Negative for abdominal pain, constipation, diarrhea and vomiting.   Endocrine: Negative.    Genitourinary: Negative.  Negative for genital sores.   Musculoskeletal:  Negative for arthralgias.   Skin: Negative.  Negative for rash.   Allergic/Immunologic: Positive for environmental allergies.   Neurological:  Negative for headaches.   Hematological: Negative.  Does not bruise/bleed easily.   Psychiatric/Behavioral: Negative.         Objective:   /77   Pulse 70   Ht 6' (1.829 m)   Wt 87.5 kg (192 lb 14.4 oz)   BMI 26.16 kg/m²      Physical Exam   Constitutional: He is oriented to person, place, and time.   HENT:   Head: Normocephalic and atraumatic.   Eyes: Conjunctivae are normal.   Cardiovascular: Normal rate, regular rhythm and normal heart sounds.   Pulmonary/Chest: Effort normal and breath sounds normal.   Abdominal: Soft. Bowel sounds are normal.   Musculoskeletal:      Cervical back: Normal range of motion and neck supple.      Comments: Schober 3 cm  Chest expansion 4 cm  No pain bilateral SI joints  Occiput to wall- able to touch occiput to wall     Neurological: He is alert and oriented to person, place, and time. Gait normal.   Skin: Skin is warm and dry.   Psychiatric: Mood and affect  normal.     LABS    Component      Latest Ref Rng & Units 2/10/2023   WBC      3.90 - 12.70 K/uL 5.40   RBC      4.60 - 6.20 M/uL 5.24   Hemoglobin      14.0 - 18.0 g/dL 15.2   Hematocrit      40.0 - 54.0 % 46.6   MCV      82 - 98 fL 89   MCH      27.0 - 31.0 pg 29.0   MCHC      32.0 - 36.0 g/dL 32.6   RDW      11.5 - 14.5 % 13.5   Platelets      150 - 450 K/uL 327   MPV      9.2 - 12.9 fL 10.7   Immature Granulocytes      0.0 - 0.5 % 0.0   Gran # (ANC)      1.8 - 7.7 K/uL 1.0 (L)   Immature Grans (Abs)      0.00 - 0.04 K/uL 0.00   Lymph #      1.0 - 4.8 K/uL 3.4   Mono #      0.3 - 1.0 K/uL 0.5   Eos #      0.0 - 0.5 K/uL 0.4   Baso #      0.00 - 0.20 K/uL 0.06   nRBC      0 /100 WBC 0   Gran %      38.0 - 73.0 % 19.1 (L)   Lymph %      18.0 - 48.0 % 63.0 (H)   Mono %      4.0 - 15.0 % 9.8   Eosinophil %      0.0 - 8.0 % 7.0   Basophil %      0.0 - 1.9 % 1.1   Differential Method       Automated   Sodium      136 - 145 mmol/L 138   Potassium      3.5 - 5.1 mmol/L 4.4   Chloride      95 - 110 mmol/L 102   CO2      23 - 29 mmol/L 24   Glucose      70 - 110 mg/dL 84   BUN      6 - 20 mg/dL 5 (L)   Creatinine      0.5 - 1.4 mg/dL 0.8   Calcium      8.7 - 10.5 mg/dL 9.4   PROTEIN TOTAL      6.0 - 8.4 g/dL 7.3   Albumin      3.5 - 5.2 g/dL 4.3   BILIRUBIN TOTAL      0.1 - 1.0 mg/dL 0.6   Alkaline Phosphatase      55 - 135 U/L 81   AST      10 - 40 U/L 19   ALT      10 - 44 U/L 11   Anion Gap      8 - 16 mmol/L 12   eGFR      >60 mL/min/1.73 m:2 >60.0   Sed Rate      0 - 23 mm/Hr 11   CRP      0.0 - 8.2 mg/L 1.6       Assessment:       1.  Ankylosing spondylitis.  Musculoskeletal Chest and back pain.  Abnormal occiput to wall, chest wall expansion, Schobers and SI joint pain along with inflammatory back pain.   Now doing well.   2.  Sarcoidosis of GI tract.  Will need to be followed  3.  Positive antibody for Crohns  4.  Myalgias  5.  Joint pains  6.  Fatigue  7.  Childhood asthma  8.  Childhood allergies  9.  Rash on  neck.  Ointment from primary  10. Left 4th toe nail fungus.  Topical treatment from primary.  11. Recurrent sinusitis  12. Right 1st IP joint pain    DDX  Ankylosing spondylitis, Crohns, sarcoidosis  Plan:       1.  Continue Humira.  2.  Check labs   3.  Follow with ENT  4.  Restart PT to get back range of motion he obtained.  5.  OTC voltaren gel.  Flex and straighten thumb to maintain mobility.       RTO in 6 months/prn

## 2023-03-14 NOTE — PROGRESS NOTES
Rapid3 Question Responses and Scores 3/8/2023   MDHAQ Score 0   Psychologic Score 0   Pain Score 0   When you awakened in the morning OVER THE LAST WEEK, did you feel stiff? No   If Yes, please indicate the number of hours until you are as limber as you will be for the day -   Fatigue Score 0   Global Health Score 0   RAPID3 Score 0     Answers submitted by the patient for this visit:  Rheumatology Questionnaire (Submitted on 3/8/2023)  fever: No  eye redness: No  mouth sores: No  headaches: No  shortness of breath: No  chest pain: No  trouble swallowing: No  diarrhea: No  constipation: No  unexpected weight change: No  genital sore: No  During the last 3 days, have you had a skin rash?: No  Bruises or bleeds easily: No  cough: No

## 2023-04-03 ENCOUNTER — SPECIALTY PHARMACY (OUTPATIENT)
Dept: PHARMACY | Facility: CLINIC | Age: 38
End: 2023-04-03
Payer: MEDICAID

## 2023-04-03 NOTE — TELEPHONE ENCOUNTER
Specialty Pharmacy - Refill Coordination    Specialty Medication Orders Linked to Encounter      Flowsheet Row Most Recent Value   Medication #1 adalimumab (HUMIRA,CF, PEN) 40 mg/0.4 mL PnKt (Order#[id number], Rx#[id number])            Refill Questions - Documented Responses      Flowsheet Row Most Recent Value   Patient Availability and HIPAA Verification    Does patient want to proceed with activity? Yes   HIPAA/medical authority confirmed? Yes   Relationship to patient of person spoken to? Self   Refill Screening Questions    Changes to allergies? No   Changes to medications? No   New conditions since last clinic visit? No   Unplanned office visit, urgent care, ED, or hospital admission in the last 4 weeks? No   How does patient/caregiver feel medication is working? Good   Financial problems or insurance changes? No   How many doses of your specialty medications were missed in the last 4 weeks? 0   Would patient like to speak to a pharmacist? No   When does the patient need to receive the medication? 23   Refill Delivery Questions    How will the patient receive the medication? Pickup   When does the patient need to receive the medication? 23   Expected Copay ($) 0   Is the patient able to afford the medication copay? Yes   Payment Method zero copay   Days supply of Refill 28   Supplies needed? No supplies needed   Refill activity completed? Yes   Refill activity plan Refill scheduled   Shipment/Pickup Date: 23            Current Outpatient Medications   Medication Sig    adalimumab (HUMIRA,CF, PEN) 40 mg/0.4 mL PnKt Inject 0.4 mL (40 mg total) into the skin every 14 (fourteen) days.    budesonide (PULMICORT) 0.5 mg/2 mL nebulizer solution SMARTSI Packet(s) Both Nares Twice Daily    EPINEPHrine (EPIPEN) 0.3 mg/0.3 mL AtIn INJ 1 PEN SC PRN UTD    fluticasone propionate (FLONASE) 50 mcg/actuation nasal spray SHAKE LQ AND U 1 SPR IEN BID    triamcinolone acetonide 0.1% (KENALOG) 0.1 % cream  APPLY THIN LAYER TOPICALLY TO THE AFFECTED AREA TWICE DAILY   Last reviewed on 3/14/2023  9:47 AM by Josefa Pastrana MD    Review of patient's allergies indicates:   Allergen Reactions    Dupixent pen [dupilumab] Other (See Comments)     The patient claims to have flu like symptoms after injection    Tramadol Itching    Celery (apium graveolens) (umbelliferae)     Chicken derived     Corn     Grass pollen-julia grass standard      TREE POLLEN    BRADEN GRASS     Milk      ALL DAIRY     Peanut      WALNUTS PECANS     Shrimp     Soy     Wheat     Last reviewed on  3/14/2023 9:47 AM by Josefa Pastrana      Tasks added this encounter   4/27/2023 - Refill Call (Auto Added)  4/5/2023 - Pickup Reminder   Tasks due within next 3 months   No tasks due.     [facility]  [facility] [facility] [address]  Phone: [phone]  Fax: [phone]

## 2023-04-27 DIAGNOSIS — M79.10 MYALGIA: ICD-10-CM

## 2023-04-27 DIAGNOSIS — D84.9 IMMUNOSUPPRESSION: ICD-10-CM

## 2023-04-27 DIAGNOSIS — R53.83 FATIGUE, UNSPECIFIED TYPE: ICD-10-CM

## 2023-04-27 DIAGNOSIS — M45.8 ANKYLOSING SPONDYLITIS OF SACRAL REGION: ICD-10-CM

## 2023-04-27 DIAGNOSIS — D86.89 SARCOIDOSIS OF DIGESTIVE SYSTEM: ICD-10-CM

## 2023-04-28 RX ORDER — ADALIMUMAB 40MG/0.4ML
40 KIT SUBCUTANEOUS
Qty: 2 PEN | Refills: 2 | Status: ACTIVE | OUTPATIENT
Start: 2023-04-28 | End: 2023-07-27 | Stop reason: SDUPTHER

## 2023-05-03 ENCOUNTER — SPECIALTY PHARMACY (OUTPATIENT)
Dept: PHARMACY | Facility: CLINIC | Age: 38
End: 2023-05-03
Payer: MEDICAID

## 2023-05-03 NOTE — TELEPHONE ENCOUNTER
Specialty Pharmacy - Refill Coordination    Specialty Medication Orders Linked to Encounter      Flowsheet Row Most Recent Value   Medication #1 adalimumab (HUMIRA,CF, PEN) 40 mg/0.4 mL PnKt (Order#[id number], Rx#[id number])            Refill Questions - Documented Responses      Flowsheet Row Most Recent Value   Refill Screening Questions    Changes to allergies? No   Changes to medications? No   New conditions since last clinic visit? No   Unplanned office visit, urgent care, ED, or hospital admission in the last 4 weeks? No   How does patient/caregiver feel medication is working? Excellent   Financial problems or insurance changes? No   How many doses of your specialty medications were missed in the last 4 weeks? 0   Would patient like to speak to a pharmacist? No   When does the patient need to receive the medication? 23   Refill Delivery Questions    How will the patient receive the medication? Pickup   When does the patient need to receive the medication? 23   Shipping Address Home   Address in [facility] confirmed and updated if neccessary? Yes   Expected Copay ($) 0   Is the patient able to afford the medication copay? Yes   Payment Method zero copay   Days supply of Refill 28   Supplies needed? No supplies needed   Refill activity completed? Yes   Refill activity plan Refill scheduled   Shipment/Pickup Date: 23            Current Outpatient Medications   Medication Sig    adalimumab (HUMIRA,CF, PEN) 40 mg/0.4 mL PnKt Inject 0.4 mL (40 mg total) into the skin every 14 (fourteen) days.    budesonide (PULMICORT) 0.5 mg/2 mL nebulizer solution SMARTSI Packet(s) Both Nares Twice Daily    EPINEPHrine (EPIPEN) 0.3 mg/0.3 mL AtIn INJ 1 PEN SC PRN UTD    fluticasone propionate (FLONASE) 50 mcg/actuation nasal spray SHAKE LQ AND U 1 SPR IEN BID    triamcinolone acetonide 0.1% (KENALOG) 0.1 % cream APPLY THIN LAYER TOPICALLY TO THE AFFECTED AREA TWICE DAILY   Last reviewed on 3/14/2023   9:47 AM by Josefa Pastrana MD    Review of patient's allergies indicates:   Allergen Reactions    Dupixent pen [dupilumab] Other (See Comments)     The patient claims to have flu like symptoms after injection    Tramadol Itching    Celery (apium graveolens) (umbelliferae)     Chicken derived     Corn     Grass pollen-june grass standard      TREE POLLEN    BRADEN GRASS     Milk      ALL DAIRY     Peanut      WALNUTS PECANS     Shrimp     Soy     Wheat     Last reviewed on  3/14/2023 9:47 AM by Josefa Pastrana      Tasks added this encounter   No tasks added.   Tasks due within next 3 months   5/4/2023 - Refill Coordination Outreach (1 time occurrence)     Anthony Hugo, PharmD  [facility]  [address] [place] [address]  Phone: [phone]  Fax: [phone]

## 2023-05-12 ENCOUNTER — PATIENT MESSAGE (OUTPATIENT)
Dept: RHEUMATOLOGY | Facility: CLINIC | Age: 38
End: 2023-05-12
Payer: MEDICAID

## 2023-05-12 ENCOUNTER — LAB VISIT (OUTPATIENT)
Dept: LAB | Facility: HOSPITAL | Age: 38
End: 2023-05-12
Attending: INTERNAL MEDICINE
Payer: MEDICAID

## 2023-05-12 DIAGNOSIS — M45.8 ANKYLOSING SPONDYLITIS OF SACRAL REGION: ICD-10-CM

## 2023-05-12 DIAGNOSIS — R53.83 FATIGUE, UNSPECIFIED TYPE: ICD-10-CM

## 2023-05-12 DIAGNOSIS — D84.9 IMMUNOSUPPRESSION: ICD-10-CM

## 2023-05-12 LAB
ALBUMIN SERPL BCP-MCNC: 3.9 G/DL (ref 3.5–5.2)
ALP SERPL-CCNC: 69 U/L (ref 55–135)
ALT SERPL W/O P-5'-P-CCNC: 10 U/L (ref 10–44)
ANION GAP SERPL CALC-SCNC: 10 MMOL/L (ref 8–16)
ANISOCYTOSIS BLD QL SMEAR: SLIGHT
AST SERPL-CCNC: 14 U/L (ref 10–40)
BASOPHILS # BLD AUTO: 0.04 K/UL (ref 0–0.2)
BASOPHILS NFR BLD: 0.8 % (ref 0–1.9)
BILIRUB SERPL-MCNC: 0.6 MG/DL (ref 0.1–1)
BUN SERPL-MCNC: 5 MG/DL (ref 6–20)
CALCIUM SERPL-MCNC: 9.6 MG/DL (ref 8.7–10.5)
CHLORIDE SERPL-SCNC: 103 MMOL/L (ref 95–110)
CO2 SERPL-SCNC: 26 MMOL/L (ref 23–29)
CREAT SERPL-MCNC: 0.8 MG/DL (ref 0.5–1.4)
CRP SERPL-MCNC: 0.4 MG/L (ref 0–8.2)
DIFFERENTIAL METHOD: ABNORMAL
EOSINOPHIL # BLD AUTO: 0.2 K/UL (ref 0–0.5)
EOSINOPHIL NFR BLD: 3.5 % (ref 0–8)
ERYTHROCYTE [DISTWIDTH] IN BLOOD BY AUTOMATED COUNT: 13.9 % (ref 11.5–14.5)
ERYTHROCYTE [SEDIMENTATION RATE] IN BLOOD BY PHOTOMETRIC METHOD: 6 MM/HR (ref 0–23)
EST. GFR  (NO RACE VARIABLE): >60 ML/MIN/1.73 M^2
GLUCOSE SERPL-MCNC: 82 MG/DL (ref 70–110)
HCT VFR BLD AUTO: 40.2 % (ref 40–54)
HGB BLD-MCNC: 12.8 G/DL (ref 14–18)
IMM GRANULOCYTES # BLD AUTO: 0 K/UL (ref 0–0.04)
IMM GRANULOCYTES NFR BLD AUTO: 0 % (ref 0–0.5)
LYMPHOCYTES # BLD AUTO: 3.5 K/UL (ref 1–4.8)
LYMPHOCYTES NFR BLD: 67.4 % (ref 18–48)
MCH RBC QN AUTO: 28.7 PG (ref 27–31)
MCHC RBC AUTO-ENTMCNC: 31.8 G/DL (ref 32–36)
MCV RBC AUTO: 90 FL (ref 82–98)
MONOCYTES # BLD AUTO: 0.5 K/UL (ref 0.3–1)
MONOCYTES NFR BLD: 10 % (ref 4–15)
NEUTROPHILS # BLD AUTO: 0.9 K/UL (ref 1.8–7.7)
NEUTROPHILS NFR BLD: 18.3 % (ref 38–73)
NRBC BLD-RTO: 0 /100 WBC
PLATELET # BLD AUTO: 296 K/UL (ref 150–450)
PLATELET BLD QL SMEAR: ABNORMAL
PMV BLD AUTO: 9.6 FL (ref 9.2–12.9)
POTASSIUM SERPL-SCNC: 4.1 MMOL/L (ref 3.5–5.1)
PROT SERPL-MCNC: 6.7 G/DL (ref 6–8.4)
RBC # BLD AUTO: 4.46 M/UL (ref 4.6–6.2)
SODIUM SERPL-SCNC: 139 MMOL/L (ref 136–145)
WBC # BLD AUTO: 5.12 K/UL (ref 3.9–12.7)

## 2023-05-12 PROCEDURE — 36415 COLL VENOUS BLD VENIPUNCTURE: CPT | Performed by: INTERNAL MEDICINE

## 2023-05-12 PROCEDURE — 86140 C-REACTIVE PROTEIN: CPT | Performed by: INTERNAL MEDICINE

## 2023-05-12 PROCEDURE — 80053 COMPREHEN METABOLIC PANEL: CPT | Performed by: INTERNAL MEDICINE

## 2023-05-12 PROCEDURE — 85652 RBC SED RATE AUTOMATED: CPT | Performed by: INTERNAL MEDICINE

## 2023-05-12 PROCEDURE — 85025 COMPLETE CBC W/AUTO DIFF WBC: CPT | Performed by: INTERNAL MEDICINE

## 2023-05-25 ENCOUNTER — SPECIALTY PHARMACY (OUTPATIENT)
Dept: PHARMACY | Facility: CLINIC | Age: 38
End: 2023-05-25
Payer: MEDICAID

## 2023-05-25 NOTE — TELEPHONE ENCOUNTER
Outgoing call to pt regarding humira refill. Next injection 6/1. RTS until 5/26. Will follow up tomorrow for refill.

## 2023-05-30 NOTE — TELEPHONE ENCOUNTER
Specialty Pharmacy - Refill Coordination    Specialty Medication Orders Linked to Encounter      Flowsheet Row Most Recent Value   Medication #1 adalimumab (HUMIRA,CF, PEN) 40 mg/0.4 mL PnKt (Order#[id number], Rx#[id number])            Refill Questions - Documented Responses      Flowsheet Row Most Recent Value   Patient Availability and HIPAA Verification    Does patient want to proceed with activity? Yes   HIPAA/medical authority confirmed? Yes   Relationship to patient of person spoken to? Self   Refill Screening Questions    Changes to allergies? No   Changes to medications? No   New conditions since last clinic visit? No   Unplanned office visit, urgent care, ED, or hospital admission in the last 4 weeks? No   How does patient/caregiver feel medication is working? Good   How many doses of your specialty medications were missed in the last 4 weeks? 0   Would patient like to speak to a pharmacist? No   When does the patient need to receive the medication? 23   Refill Delivery Questions    How will the patient receive the medication? Pickup   When does the patient need to receive the medication? 23   Expected Copay ($) 0   Is the patient able to afford the medication copay? Yes   Payment Method zero copay   Days supply of Refill 28   Supplies needed? No supplies needed   Refill activity completed? Yes   Refill activity plan Refill scheduled   Shipment/Pickup Date: 23            Current Outpatient Medications   Medication Sig    adalimumab (HUMIRA,CF, PEN) 40 mg/0.4 mL PnKt Inject 0.4 mL (40 mg total) into the skin every 14 (fourteen) days.    budesonide (PULMICORT) 0.5 mg/2 mL nebulizer solution SMARTSI Packet(s) Both Nares Twice Daily    EPINEPHrine (EPIPEN) 0.3 mg/0.3 mL AtIn INJ 1 PEN SC PRN UTD    fluticasone propionate (FLONASE) 50 mcg/actuation nasal spray SHAKE LQ AND U 1 SPR IEN BID    triamcinolone acetonide 0.1% (KENALOG) 0.1 % cream APPLY THIN LAYER TOPICALLY TO THE AFFECTED AREA  TWICE DAILY   Last reviewed on 3/14/2023  9:47 AM by Josefa Pastrana MD    Review of patient's allergies indicates:   Allergen Reactions    Dupixent pen [dupilumab] Other (See Comments)     The patient claims to have flu like symptoms after injection    Tramadol Itching    Celery (apium graveolens) (umbelliferae)     Chicken derived     Corn     Grass pollen-june grass standard      TREE POLLEN    BRADEN GRASS     Milk      ALL DAIRY     Peanut      WALNUTS PECANS     Shrimp     Soy     Wheat     Last reviewed on  3/14/2023 9:47 AM by Josefa Pastrana      Tasks added this encounter   No tasks added.   Tasks due within next 3 months   5/29/2023 - Refill Coordination Outreach (1 time occurrence)     [facility]  [address]  [facility] [address]  Phone: [phone]  Fax: [phone]

## 2023-06-21 ENCOUNTER — PATIENT MESSAGE (OUTPATIENT)
Dept: PHARMACY | Facility: CLINIC | Age: 38
End: 2023-06-21
Payer: MEDICAID

## 2023-06-26 ENCOUNTER — SPECIALTY PHARMACY (OUTPATIENT)
Dept: PHARMACY | Facility: CLINIC | Age: 38
End: 2023-06-26
Payer: MEDICAID

## 2023-06-26 NOTE — TELEPHONE ENCOUNTER
Specialty Pharmacy - Refill Coordination    Specialty Medication Orders Linked to Encounter      Flowsheet Row Most Recent Value   Medication #1 adalimumab (HUMIRA,CF, PEN) 40 mg/0.4 mL PnKt (Order#[id number], Rx#[id number])            Refill Questions - Documented Responses      Flowsheet Row Most Recent Value   Patient Availability and HIPAA Verification    Does patient want to proceed with activity? Yes   HIPAA/medical authority confirmed? Yes   Relationship to patient of person spoken to? Self   Refill Screening Questions    Changes to allergies? No   Changes to medications? No   New conditions since last clinic visit? No   Unplanned office visit, urgent care, ED, or hospital admission in the last 4 weeks? No   How does patient/caregiver feel medication is working? Good   Financial problems or insurance changes? No   How many doses of your specialty medications were missed in the last 4 weeks? 0   Would patient like to speak to a pharmacist? No   When does the patient need to receive the medication? 23   Refill Delivery Questions    How will the patient receive the medication? Pickup   When does the patient need to receive the medication? 23   Expected Copay ($) 0   Is the patient able to afford the medication copay? Yes   Payment Method zero copay   Days supply of Refill 28   Supplies needed? No supplies needed   Refill activity completed? Yes   Refill activity plan Refill scheduled   Shipment/Pickup Date: 23            Current Outpatient Medications   Medication Sig    adalimumab (HUMIRA,CF, PEN) 40 mg/0.4 mL PnKt Inject 0.4 mL (40 mg total) into the skin every 14 (fourteen) days.    budesonide (PULMICORT) 0.5 mg/2 mL nebulizer solution SMARTSI Packet(s) Both Nares Twice Daily    EPINEPHrine (EPIPEN) 0.3 mg/0.3 mL AtIn INJ 1 PEN SC PRN UTD    fluticasone propionate (FLONASE) 50 mcg/actuation nasal spray SHAKE LQ AND U 1 SPR IEN BID    triamcinolone acetonide 0.1% (KENALOG) 0.1 % cream  APPLY THIN LAYER TOPICALLY TO THE AFFECTED AREA TWICE DAILY   Last reviewed on 3/14/2023  9:47 AM by Josefa Pastrana MD    Review of patient's allergies indicates:   Allergen Reactions    Dupixent pen [dupilumab] Other (See Comments)     The patient claims to have flu like symptoms after injection    Tramadol Itching    Celery (apium graveolens) (umbelliferae)     Chicken derived     Corn     Grass pollen-julia grass standard      TREE POLLEN    BRADEN GRASS     Milk      ALL DAIRY     Peanut      WALNUTS PECANS     Shrimp     Soy     Wheat     Last reviewed on  3/14/2023 9:47 AM by Josefa Pastrana      Tasks added this encounter   No tasks added.   Tasks due within next 3 months   6/24/2023 - Refill Coordination Outreach (1 time occurrence)     [facility]  [address] [place] [address]  Phone: [phone]  Fax: [phone]

## 2023-07-27 ENCOUNTER — SPECIALTY PHARMACY (OUTPATIENT)
Dept: PHARMACY | Facility: CLINIC | Age: 38
End: 2023-07-27
Payer: MEDICAID

## 2023-07-27 DIAGNOSIS — D84.9 IMMUNOSUPPRESSION: ICD-10-CM

## 2023-07-27 DIAGNOSIS — M79.10 MYALGIA: ICD-10-CM

## 2023-07-27 DIAGNOSIS — M45.8 ANKYLOSING SPONDYLITIS OF SACRAL REGION: ICD-10-CM

## 2023-07-27 DIAGNOSIS — D86.89 SARCOIDOSIS OF DIGESTIVE SYSTEM: ICD-10-CM

## 2023-07-27 DIAGNOSIS — R53.83 FATIGUE, UNSPECIFIED TYPE: ICD-10-CM

## 2023-07-27 RX ORDER — ADALIMUMAB 40MG/0.4ML
40 KIT SUBCUTANEOUS
Qty: 2 PEN | Refills: 0 | Status: ACTIVE | OUTPATIENT
Start: 2023-07-27 | End: 2023-08-18 | Stop reason: SDUPTHER

## 2023-07-27 NOTE — TELEPHONE ENCOUNTER
Patient due to inject 07/27/23. No refills on hand and patient has no remaining pens. Faxed request over to MD for refills. [facility] opened for missed dose counseling.

## 2023-07-28 ENCOUNTER — HOSPITAL ENCOUNTER (EMERGENCY)
Facility: HOSPITAL | Age: 38
Discharge: HOME | End: 2023-07-28
Attending: EMERGENCY MEDICINE
Payer: MEDICAID

## 2023-07-28 VITALS
WEIGHT: 186 LBS | HEART RATE: 80 BPM | TEMPERATURE: 99 F | HEIGHT: 72 IN | RESPIRATION RATE: 18 BRPM | BODY MASS INDEX: 25.19 KG/M2 | SYSTOLIC BLOOD PRESSURE: 152 MMHG | OXYGEN SATURATION: 100 % | DIASTOLIC BLOOD PRESSURE: 91 MMHG

## 2023-07-28 DIAGNOSIS — S05.01XA CORNEAL ABRASION, RIGHT, INITIAL ENCOUNTER: Primary | ICD-10-CM

## 2023-07-28 PROCEDURE — 25000003 PHARM REV CODE 250

## 2023-07-28 PROCEDURE — 99283 EMERGENCY DEPT VISIT LOW MDM: CPT

## 2023-07-28 RX ORDER — ACETAMINOPHEN 500 MG
1000 TABLET ORAL
Status: COMPLETED | OUTPATIENT
Start: 2023-07-28 | End: 2023-07-28

## 2023-07-28 RX ORDER — ERYTHROMYCIN 5 MG/G
OINTMENT OPHTHALMIC
Qty: 3.5 G | Refills: 0 | Status: SHIPPED | OUTPATIENT
Start: 2023-07-28 | End: 2024-03-11

## 2023-07-28 RX ORDER — PROPARACAINE HYDROCHLORIDE 5 MG/ML
1 SOLUTION/ DROPS OPHTHALMIC
Status: COMPLETED | OUTPATIENT
Start: 2023-07-28 | End: 2023-07-28

## 2023-07-28 RX ADMIN — ACETAMINOPHEN 1000 MG: 500 TABLET ORAL at 03:07

## 2023-07-28 RX ADMIN — PROPARACAINE HYDROCHLORIDE 1 DROP: 5 SOLUTION/ DROPS OPHTHALMIC at 03:07

## 2023-07-28 NOTE — TELEPHONE ENCOUNTER
In-Pharmacy: patient came to  his medication today but stated he recently left urgent care who rx'd him some antibiotic cream for his eye. He wanted to see if that would affect anything with his Humira.    Advised him to hold off on his Humira until his course of antibiotics was complete and the infection healed up. Patient acknowledged understanding.    Will notify assigned [facility] to make sure we assess this at next follow-up call.

## 2023-07-28 NOTE — ED PROVIDER NOTES
Encounter Date: 7/28/2023       History     Chief Complaint   Patient presents with    Eye Injury     R eye injury with branch yest, pain and tearing     37-year-old male with history of, and sinusitis presents to the ED regarding significant right eye pain and tearing.  Patient was trimming branches yesterday evening when one fell and hit him directly into the right eye.  He went to [facility] and bought eye rinse which he did for about 2 minutes this morning.  Denies visual changes, headache, chest pain, or any other symptoms at this time.  Does not wear contacts or corrective lenses.    The history is provided by the patient and medical records.   Review of patient's allergies indicates:   Allergen Reactions    Dupixent pen [dupilumab] Other (See Comments)     The patient claims to have flu like symptoms after injection    Tramadol Itching    Celery (apium graveolens) (umbelliferae)     Chicken derived     Corn     Grass pollen-june grass standard      TREE POLLEN    BRADEN GRASS     Milk      ALL DAIRY     Peanut      WALNUTS PECANS     Shrimp     Soy     Wheat      Past Medical History:   Diagnosis Date    Allergy     Asthma     Hyperlipidemia     IBS (irritable bowel syndrome)     Lactose intolerance     Sinusitis      Past Surgical History:   Procedure Laterality Date    SINUS SURGERY       Family History   Problem Relation Age of Onset    Rheum arthritis Mother     Rheum arthritis Maternal Uncle     Diabetes Mellitus Father     Hypertension Father     Lupus Neg Hx     Inflammatory bowel disease Neg Hx      Social History     Tobacco Use    Smoking status: Never    Smokeless tobacco: Never   Substance Use Topics    Alcohol use: Yes     Comment: occasionally    Drug use: Yes     Frequency: 7.0 times per week     Types: Marijuana     Comment: cbd     Review of Systems   Constitutional:  Negative for fever.   HENT:  Negative for sore throat.    Eyes:  Positive for pain and discharge. Negative for visual disturbance.    Respiratory:  Negative for shortness of breath.    Cardiovascular:  Negative for chest pain.   Gastrointestinal:  Negative for nausea.   Genitourinary:  Negative for dysuria.   Musculoskeletal:  Negative for back pain.   Skin:  Negative for rash.   Neurological:  Negative for weakness.   Hematological:  Does not bruise/bleed easily.     Physical Exam     Initial Vitals [07/28/23 1321]   BP Pulse Resp Temp SpO2   (!) 152/91 80 18 98.9 °F (37.2 °C) 100 %      MAP       --         Physical Exam    Vitals reviewed.  Constitutional: He appears well-developed and well-nourished. He is not diaphoretic. No distress.   HENT:   Head: Normocephalic and atraumatic.   Nose: Nose normal. No sinus tenderness. Right sinus exhibits no maxillary sinus tenderness and no frontal sinus tenderness. Left sinus exhibits no maxillary sinus tenderness and no frontal sinus tenderness.   No periorbital tenderness or edema.  No ecchymosis or lacerations.   Eyes: EOM and lids are normal. Pupils are equal, round, and reactive to light. Right eye exhibits discharge. Right eye exhibits no exudate. No foreign body present in the right eye. Left eye exhibits no discharge and no exudate. Right conjunctiva is injected. Right conjunctiva has no hemorrhage. Left conjunctiva is not injected. Left conjunctiva has no hemorrhage.   Watery, tearing from right eye.  Fluorescein uptake seen with Wood's lamp. See picture below   Neck: Neck supple.   Cardiovascular:  Normal rate, regular rhythm and normal heart sounds.     Exam reveals no gallop and no friction rub.       No murmur heard.  Pulmonary/Chest: Breath sounds normal. He has no wheezes. He has no rhonchi. He has no rales.   Musculoskeletal:      Cervical back: Neck supple.               ED Course   Procedures  Labs Reviewed - No data to display       Imaging Results    None          Medications   proparacaine 0.5 % ophthalmic solution 1 drop (1 drop Both Eyes Given 7/28/23 1521)   acetaminophen tablet  1,000 mg (1,000 mg Oral Given 7/28/23 1521)     Medical Decision Making:   History:   Old Medical Records: I decided to obtain old medical records.  Initial Assessment:   37-year-old male with history of, and sinusitis presents to the ED regarding significant right eye pain and tearing after branch fell directly on his eye yesterday evening. VSS. Right conjunctiva redness with uptake seen over right cornea. Suspect corneal abrasion. No signs of trauma surrounding eye or to the head with no TTP. No concern for globe or orbital injury.  Differential Diagnosis:   My differential diagnoses include but are not limited to:   Corneal abrasion, scleral abrasion, corneal ulcer, globe injury, orbital fracture   ED Management:  Vision 20/20. Rx'd erythromycin and referral to Ophthalmology placed. Advised to follow up with them and PCP. Strict ED return precautions given with all questions answered.  Patient verbalized understanding and agreed to plan. Vitals are stable and safe for discharge.   I have reviewed the patient's records and discussed with my supervising physician.          Attending Attestation:     Physician Attestation Statement for NP/PA:   I have directed and reviewed the workup performed by the PA/NP.  I performed the substantive portion of the medical decision making.     Other NP/PA Attestation Additions:    History of Present Illness: Eye injury    Medical Decision Making: I personally evaluated the patient and performed a face-to-face evaluation           ED Course as of 07/28/23 2121 Fri Jul 28, 2023   1515 Vision 20/20 bilaterally [KB]      ED Course User Index  [KB] Wendi Quarles PA-C                 Clinical Impression:   Final diagnoses:  [S05.01XA] Corneal abrasion, right, initial encounter (Primary)        ED Disposition Condition    Discharge Stable          ED Prescriptions       Medication Sig Dispense Start Date End Date Auth. Provider    erythromycin (ROMYCIN) ophthalmic ointment Place  a 1/2 inch ribbon of ointment into the lower eyelid. 3.5 g 7/28/2023 -- Wendi Qualres PA-C          Follow-up Information       Follow up With Specialties Details Why Contact Info Additional Information    Janelle Arvizu MD Internal Medicine, Pediatrics Schedule an appointment as soon as possible for a visit   [address]  [facility] [address]  [phone]       [facility] - [address] [place] [address] Ophthalmology Schedule an appointment as soon as possible for a visit   [address]  [facility] [address] [phone] Please arrive on the 10th floor for check-in.    [facility] - [facility] Emergency Medicine Go to  If symptoms worsen [address]  [facility] [id number] [phone]              Wendi Quarles PA-C  07/28/23 1536       Mario Jerez III, MD  07/28/23 2129

## 2023-07-28 NOTE — ED TRIAGE NOTES
Patient reports a tree branch hitting his right eye yesterday and now is painful and has redness. AAOx3

## 2023-07-28 NOTE — TELEPHONE ENCOUNTER
Specialty Pharmacy - Refill Coordination    Specialty Medication Orders Linked to Encounter      Flowsheet Row Most Recent Value   Medication #1 adalimumab (HUMIRA,CF, PEN) 40 mg/0.4 mL PnKt (Order#[id number], Rx#[id number])            Refill Questions - Documented Responses      Flowsheet Row Most Recent Value   Patient Availability and HIPAA Verification    Does patient want to proceed with activity? Yes   HIPAA/medical authority confirmed? Yes   Relationship to patient of person spoken to? Self   Refill Screening Questions    Changes to allergies? No   Changes to medications? No   New conditions since last clinic visit? No   Unplanned office visit, urgent care, ED, or hospital admission in the last 4 weeks? No   How does patient/caregiver feel medication is working? Good   Financial problems or insurance changes? No   How many doses of your specialty medications were missed in the last 4 weeks? 0  [one day late due to refill authorization]   Would patient like to speak to a pharmacist? No   When does the patient need to receive the medication? 23   Refill Delivery Questions    How will the patient receive the medication? Pickup   When does the patient need to receive the medication? 23   Expected Copay ($) 0   Is the patient able to afford the medication copay? Yes   Payment Method zero copay   Days supply of Refill 28   Supplies needed? No supplies needed   Refill activity completed? Yes   Refill activity plan Refill scheduled   Shipment/Pickup Date: 23            Current Outpatient Medications   Medication Sig    adalimumab (HUMIRA,CF, PEN) 40 mg/0.4 mL PnKt Inject 0.4 mL (40 mg total) into the skin every 14 (fourteen) days.    budesonide (PULMICORT) 0.5 mg/2 mL nebulizer solution SMARTSI Packet(s) Both Nares Twice Daily    EPINEPHrine (EPIPEN) 0.3 mg/0.3 mL AtIn INJ 1 PEN SC PRN UTD    fluticasone propionate (FLONASE) 50 mcg/actuation nasal spray SHAKE LQ AND U 1 SPR IEN BID     triamcinolone acetonide 0.1% (KENALOG) 0.1 % cream APPLY THIN LAYER TOPICALLY TO THE AFFECTED AREA TWICE DAILY   Last reviewed on 3/14/2023  9:47 AM by Josefa Pastrana MD    Review of patient's allergies indicates:   Allergen Reactions    Dupixent pen [dupilumab] Other (See Comments)     The patient claims to have flu like symptoms after injection    Tramadol Itching    Celery (apium graveolens) (umbelliferae)     Chicken derived     Corn     Grass pollen-june grass standard      TREE POLLEN    BRADEN GRASS     Milk      ALL DAIRY     Peanut      WALNUTS PECANS     Shrimp     Soy     Wheat     Last reviewed on  3/14/2023 9:47 AM by Josefa Pastrana      Tasks added this encounter   7/29/2023 - Pickup Reminder   Tasks due within next 3 months   No tasks due.     Anne Sanchez, PharmD  [facility]  [address] [place] [address]  Phone: [phone]  Fax: [phone]

## 2023-07-28 NOTE — FIRST PROVIDER EVALUATION
Emergency Department TeleTriage Encounter Note      CHIEF COMPLAINT    Chief Complaint   Patient presents with    Eye Injury     R eye injury with branch yest, pain and tearing       VITAL SIGNS   Initial Vitals [07/28/23 1321]   BP Pulse Resp Temp SpO2   (!) 152/91 80 18 98.9 °F (37.2 °C) 100 %      MAP       --            ALLERGIES    Review of patient's allergies indicates:   Allergen Reactions    Dupixent pen [dupilumab] Other (See Comments)     The patient claims to have flu like symptoms after injection    Tramadol Itching    Celery (apium graveolens) (umbelliferae)     Chicken derived     Corn     Grass pollen-june grass standard      TREE POLLEN    BRADEN GRASS     Milk      ALL DAIRY     Peanut      WALNUTS PECANS     Shrimp     Soy     Wheat        PROVIDER TRIAGE NOTE  This is a teletriage evaluation of a 37 y.o. male presenting to the ED complaining of eye injury. Patient was trimming a tree yesterday when he was hit with a branch. He reports pain and increased watering to right eye.     Patient is alert and oriented. He speaks in complete sentences. He is sitting upright in the chair in no distress. Eye patch in place.    Initial orders will be placed and care will be transferred to an alternate provider when patient is roomed for a full evaluation. Any additional orders and the final disposition will be determined by that provider.         ORDERS  Labs Reviewed - No data to display    ED Orders (720h ago, onward)      Start Ordered     Status Ordering Provider    07/28/23 1435 07/28/23 1436  Visual acuity screening  Once         Ordered NICHOLAS ROWLEY              Virtual Visit Note: The provider triage portion of this emergency department evaluation and documentation was performed via [employer], a HIPAA-compliant telemedicine application, in concert with a tele-presenter in the room. A face to face patient evaluation with one of my colleagues will occur once the patient is placed in an emergency  department room.      DISCLAIMER: This note was prepared with [employer] voice recognition transcription software. Garbled syntax, mangled pronouns, and other bizarre constructions may be attributed to that software system.

## 2023-07-28 NOTE — DISCHARGE INSTRUCTIONS
Use erythromycin as prescribed.  Also use artificial tears.  Take Tylenol for pain.  Return to the ED if new or worsening symptoms.

## 2023-07-28 NOTE — ED NOTES
Patient identifiers verified and correct for   LOC: The patient is awake, alert and aware of environment with an appropriate affect, the patient is oriented x 3 and speaking appropriately.   APPEARANCE: Patient appears comfortable and in no acute distress, patient is clean and well groomed.  SKIN: The skin is warm and dry, color consistent with ethnicity, patient has normal skin turgor and moist mucus membranes, skin intact, no breakdown or bruising noted.   MUSCULOSKELETAL: Patient moving all extremities spontaneously, no swelling noted.  RESPIRATORY: Airway is open and patent, respirations are spontaneous, patient has a normal effort and rate, no accessory muscle use noted, pt placed on continuous pulse ox with O2 sats noted at 97% on room air.  CARDIAC: Pt placed on cardiac monitor. Patient has a normal rate and regular rhythm, no edema noted, capillary refill < 3 seconds.   GASTRO: Soft and non tender to palpation, no distention noted, normoactive bowel sounds present in all four quadrants. Pt states bowel movements have been regular.  : Pt denies any pain or frequency with urination.  NEURO: Pt opens eyes spontaneously, behavior appropriate to situation, follows commands, facial expression symmetrical, bilateral hand grasp equal and even, purposeful motor response noted, normal sensation in all extremities when touched with a finger.     Pt reports right eye pain and redness.

## 2023-08-14 ENCOUNTER — LAB VISIT (OUTPATIENT)
Dept: LAB | Facility: HOSPITAL | Age: 38
End: 2023-08-14
Attending: INTERNAL MEDICINE
Payer: MEDICAID

## 2023-08-14 DIAGNOSIS — D86.89 SARCOIDOSIS OF DIGESTIVE SYSTEM: ICD-10-CM

## 2023-08-14 DIAGNOSIS — R53.83 FATIGUE, UNSPECIFIED TYPE: ICD-10-CM

## 2023-08-14 DIAGNOSIS — M45.8 ANKYLOSING SPONDYLITIS OF SACRAL REGION: ICD-10-CM

## 2023-08-14 DIAGNOSIS — D84.9 IMMUNOSUPPRESSION: ICD-10-CM

## 2023-08-14 LAB
ALBUMIN SERPL BCP-MCNC: 4.1 G/DL (ref 3.5–5.2)
ALP SERPL-CCNC: 76 U/L (ref 55–135)
ALT SERPL W/O P-5'-P-CCNC: 16 U/L (ref 10–44)
ANION GAP SERPL CALC-SCNC: 9 MMOL/L (ref 8–16)
AST SERPL-CCNC: 18 U/L (ref 10–40)
BASOPHILS # BLD AUTO: 0.05 K/UL (ref 0–0.2)
BASOPHILS NFR BLD: 1 % (ref 0–1.9)
BILIRUB SERPL-MCNC: 0.6 MG/DL (ref 0.1–1)
BUN SERPL-MCNC: 7 MG/DL (ref 6–20)
CALCIUM SERPL-MCNC: 9.5 MG/DL (ref 8.7–10.5)
CHLORIDE SERPL-SCNC: 102 MMOL/L (ref 95–110)
CO2 SERPL-SCNC: 29 MMOL/L (ref 23–29)
CREAT SERPL-MCNC: 0.8 MG/DL (ref 0.5–1.4)
CRP SERPL-MCNC: <0.3 MG/L (ref 0–8.2)
DIFFERENTIAL METHOD: ABNORMAL
EOSINOPHIL # BLD AUTO: 0.4 K/UL (ref 0–0.5)
EOSINOPHIL NFR BLD: 7.7 % (ref 0–8)
ERYTHROCYTE [DISTWIDTH] IN BLOOD BY AUTOMATED COUNT: 13.5 % (ref 11.5–14.5)
ERYTHROCYTE [SEDIMENTATION RATE] IN BLOOD BY PHOTOMETRIC METHOD: 8 MM/HR (ref 0–23)
EST. GFR  (NO RACE VARIABLE): >60 ML/MIN/1.73 M^2
GLUCOSE SERPL-MCNC: 74 MG/DL (ref 70–110)
HBV CORE AB SERPL QL IA: NORMAL
HBV SURFACE AB SER-ACNC: 48.73 MIU/ML
HBV SURFACE AB SER-ACNC: REACTIVE M[IU]/ML
HBV SURFACE AG SERPL QL IA: NORMAL
HCT VFR BLD AUTO: 46.3 % (ref 40–54)
HCV AB SERPL QL IA: NORMAL
HGB BLD-MCNC: 15.3 G/DL (ref 14–18)
IMM GRANULOCYTES # BLD AUTO: 0.01 K/UL (ref 0–0.04)
IMM GRANULOCYTES NFR BLD AUTO: 0.2 % (ref 0–0.5)
LYMPHOCYTES # BLD AUTO: 3 K/UL (ref 1–4.8)
LYMPHOCYTES NFR BLD: 59.5 % (ref 18–48)
MCH RBC QN AUTO: 29.4 PG (ref 27–31)
MCHC RBC AUTO-ENTMCNC: 33 G/DL (ref 32–36)
MCV RBC AUTO: 89 FL (ref 82–98)
MONOCYTES # BLD AUTO: 0.5 K/UL (ref 0.3–1)
MONOCYTES NFR BLD: 9.9 % (ref 4–15)
NEUTROPHILS # BLD AUTO: 1.1 K/UL (ref 1.8–7.7)
NEUTROPHILS NFR BLD: 21.7 % (ref 38–73)
NRBC BLD-RTO: 0 /100 WBC
PLATELET # BLD AUTO: 329 K/UL (ref 150–450)
PMV BLD AUTO: 10.4 FL (ref 9.2–12.9)
POTASSIUM SERPL-SCNC: 4.3 MMOL/L (ref 3.5–5.1)
PROT SERPL-MCNC: 7.5 G/DL (ref 6–8.4)
RBC # BLD AUTO: 5.2 M/UL (ref 4.6–6.2)
SODIUM SERPL-SCNC: 140 MMOL/L (ref 136–145)
WBC # BLD AUTO: 4.96 K/UL (ref 3.9–12.7)

## 2023-08-14 PROCEDURE — 87340 HEPATITIS B SURFACE AG IA: CPT | Performed by: INTERNAL MEDICINE

## 2023-08-14 PROCEDURE — 85025 COMPLETE CBC W/AUTO DIFF WBC: CPT | Performed by: INTERNAL MEDICINE

## 2023-08-14 PROCEDURE — 80053 COMPREHEN METABOLIC PANEL: CPT | Performed by: INTERNAL MEDICINE

## 2023-08-14 PROCEDURE — 86706 HEP B SURFACE ANTIBODY: CPT | Mod: 91 | Performed by: INTERNAL MEDICINE

## 2023-08-14 PROCEDURE — 85652 RBC SED RATE AUTOMATED: CPT | Performed by: INTERNAL MEDICINE

## 2023-08-14 PROCEDURE — 86803 HEPATITIS C AB TEST: CPT | Performed by: INTERNAL MEDICINE

## 2023-08-14 PROCEDURE — 36415 COLL VENOUS BLD VENIPUNCTURE: CPT | Performed by: INTERNAL MEDICINE

## 2023-08-14 PROCEDURE — 86704 HEP B CORE ANTIBODY TOTAL: CPT | Performed by: INTERNAL MEDICINE

## 2023-08-14 PROCEDURE — 86140 C-REACTIVE PROTEIN: CPT | Performed by: INTERNAL MEDICINE

## 2023-08-18 ENCOUNTER — SPECIALTY PHARMACY (OUTPATIENT)
Dept: PHARMACY | Facility: CLINIC | Age: 38
End: 2023-08-18
Payer: MEDICAID

## 2023-08-18 DIAGNOSIS — M45.8 ANKYLOSING SPONDYLITIS OF SACRAL REGION: ICD-10-CM

## 2023-08-18 DIAGNOSIS — R53.83 FATIGUE, UNSPECIFIED TYPE: ICD-10-CM

## 2023-08-18 DIAGNOSIS — M79.10 MYALGIA: ICD-10-CM

## 2023-08-18 DIAGNOSIS — D86.89 SARCOIDOSIS OF DIGESTIVE SYSTEM: ICD-10-CM

## 2023-08-18 DIAGNOSIS — D84.9 IMMUNOSUPPRESSION: ICD-10-CM

## 2023-08-18 RX ORDER — ADALIMUMAB 40MG/0.4ML
40 KIT SUBCUTANEOUS
Qty: 2 PEN | Refills: 2 | Status: ACTIVE | OUTPATIENT
Start: 2023-08-18 | End: 2023-12-12 | Stop reason: SDUPTHER

## 2023-08-21 NOTE — TELEPHONE ENCOUNTER
Specialty Pharmacy - Refill Coordination    Specialty Medication Orders Linked to Encounter      Flowsheet Row Most Recent Value   Medication #1 adalimumab (HUMIRA,CF, PEN) 40 mg/0.4 mL PnKt (Order#[id number], Rx#[id number])            Refill Questions - Documented Responses      Flowsheet Row Most Recent Value   Patient Availability and HIPAA Verification    Does patient want to proceed with activity? Yes   HIPAA/medical authority confirmed? Yes   Relationship to patient of person spoken to? Self   Refill Screening Questions    Changes to allergies? No   Changes to medications? No   New conditions since last clinic visit? No   Unplanned office visit, urgent care, ED, or hospital admission in the last 4 weeks? No   How does patient/caregiver feel medication is working? Good   Financial problems or insurance changes? No   How many doses of your specialty medications were missed in the last 4 weeks? 0   Would patient like to speak to a pharmacist? No   When does the patient need to receive the medication? 23   Refill Delivery Questions    How will the patient receive the medication? Pickup   When does the patient need to receive the medication? 23   Expected Copay ($) 0   Is the patient able to afford the medication copay? Yes   Payment Method zero copay   Days supply of Refill 28   Supplies needed? No supplies needed   Refill activity completed? Yes   Refill activity plan Refill scheduled   Shipment/Pickup Date: 23            Current Outpatient Medications   Medication Sig    adalimumab (HUMIRA,CF, PEN) 40 mg/0.4 mL PnKt Inject 0.4 mL (40 mg total) into the skin every 14 (fourteen) days.    budesonide (PULMICORT) 0.5 mg/2 mL nebulizer solution SMARTSI Packet(s) Both Nares Twice Daily    EPINEPHrine (EPIPEN) 0.3 mg/0.3 mL AtIn INJ 1 PEN SC PRN UTD    erythromycin (ROMYCIN) ophthalmic ointment Place a 1/2 inch ribbon of ointment into the lower eyelid.    fluticasone propionate (FLONASE) 50  mcg/actuation nasal spray SHAKE LQ AND U 1 SPR IEN BID    triamcinolone acetonide 0.1% (KENALOG) 0.1 % cream APPLY THIN LAYER TOPICALLY TO THE AFFECTED AREA TWICE DAILY   Last reviewed on 3/14/2023  9:47 AM by Josefa Pastrana MD    Review of patient's allergies indicates:   Allergen Reactions    Dupixent pen [dupilumab] Other (See Comments)     The patient claims to have flu like symptoms after injection    Tramadol Itching    Celery (apium graveolens) (umbelliferae)     Chicken derived     Corn     Grass pollen-june grass standard      TREE POLLEN    BRADEN GRASS     Milk      ALL DAIRY     Peanut      WALNUTS PECANS     Shrimp     Soy     Wheat     Last reviewed on  7/28/2023 2:43 PM by Xavi Newsome      Tasks added this encounter   8/23/2023 - Pickup Reminder   Tasks due within next 3 months   No tasks due.     Anne Sanchez, PharmD  [facility]  [address]  [facility] [address]  Phone: [phone]  Fax: [phone]

## 2023-08-22 ENCOUNTER — OFFICE VISIT (OUTPATIENT)
Dept: RHEUMATOLOGY | Facility: CLINIC | Age: 38
End: 2023-08-22
Payer: MEDICAID

## 2023-08-22 VITALS
HEART RATE: 70 BPM | DIASTOLIC BLOOD PRESSURE: 83 MMHG | HEIGHT: 73 IN | BODY MASS INDEX: 25.31 KG/M2 | SYSTOLIC BLOOD PRESSURE: 127 MMHG | WEIGHT: 191 LBS

## 2023-08-22 DIAGNOSIS — Z79.899 IMMUNOSUPPRESSION DUE TO DRUG THERAPY: ICD-10-CM

## 2023-08-22 DIAGNOSIS — M45.8 ANKYLOSING SPONDYLITIS OF SACRAL REGION: Primary | ICD-10-CM

## 2023-08-22 DIAGNOSIS — D84.821 IMMUNOSUPPRESSION DUE TO DRUG THERAPY: ICD-10-CM

## 2023-08-22 PROCEDURE — 3079F DIAST BP 80-89 MM HG: CPT | Mod: CPTII,,, | Performed by: INTERNAL MEDICINE

## 2023-08-22 PROCEDURE — 3008F PR BODY MASS INDEX (BMI) DOCUMENTED: ICD-10-PCS | Mod: CPTII,,, | Performed by: INTERNAL MEDICINE

## 2023-08-22 PROCEDURE — 99213 OFFICE O/P EST LOW 20 MIN: CPT | Mod: PBBFAC | Performed by: INTERNAL MEDICINE

## 2023-08-22 PROCEDURE — 1159F MED LIST DOCD IN RCRD: CPT | Mod: CPTII,,, | Performed by: INTERNAL MEDICINE

## 2023-08-22 PROCEDURE — 99999 PR PBB SHADOW E&M-EST. PATIENT-LVL III: CPT | Mod: PBBFAC,,, | Performed by: INTERNAL MEDICINE

## 2023-08-22 PROCEDURE — 3074F SYST BP LT 130 MM HG: CPT | Mod: CPTII,,, | Performed by: INTERNAL MEDICINE

## 2023-08-22 PROCEDURE — 1159F PR MEDICATION LIST DOCUMENTED IN MEDICAL RECORD: ICD-10-PCS | Mod: CPTII,,, | Performed by: INTERNAL MEDICINE

## 2023-08-22 PROCEDURE — 1160F RVW MEDS BY RX/DR IN RCRD: CPT | Mod: CPTII,,, | Performed by: INTERNAL MEDICINE

## 2023-08-22 PROCEDURE — 1160F PR REVIEW ALL MEDS BY PRESCRIBER/CLIN PHARMACIST DOCUMENTED: ICD-10-PCS | Mod: CPTII,,, | Performed by: INTERNAL MEDICINE

## 2023-08-22 PROCEDURE — 3074F PR MOST RECENT SYSTOLIC BLOOD PRESSURE < 130 MM HG: ICD-10-PCS | Mod: CPTII,,, | Performed by: INTERNAL MEDICINE

## 2023-08-22 PROCEDURE — 99214 OFFICE O/P EST MOD 30 MIN: CPT | Mod: S$PBB,,, | Performed by: INTERNAL MEDICINE

## 2023-08-22 PROCEDURE — 99999 PR PBB SHADOW E&M-EST. PATIENT-LVL III: ICD-10-PCS | Mod: PBBFAC,,, | Performed by: INTERNAL MEDICINE

## 2023-08-22 PROCEDURE — 99214 PR OFFICE/OUTPT VISIT, EST, LEVL IV, 30-39 MIN: ICD-10-PCS | Mod: S$PBB,,, | Performed by: INTERNAL MEDICINE

## 2023-08-22 PROCEDURE — 3008F BODY MASS INDEX DOCD: CPT | Mod: CPTII,,, | Performed by: INTERNAL MEDICINE

## 2023-08-22 PROCEDURE — 3079F PR MOST RECENT DIASTOLIC BLOOD PRESSURE 80-89 MM HG: ICD-10-PCS | Mod: CPTII,,, | Performed by: INTERNAL MEDICINE

## 2023-08-22 RX ORDER — ERGOCALCIFEROL 1.25 MG/1
50000 CAPSULE ORAL
COMMUNITY
Start: 2023-07-28 | End: 2024-03-11

## 2023-08-22 NOTE — PROGRESS NOTES
"Subjective:       Patient ID: Darshana Flor is a 37 y.o. male.    Chief Complaint: ankylosing spondylitis    HPI:  Darshana Flor is a 37 y.o. male with history of asthma diagnosed with sarcoidosis based on colonic biopsy with noncaseating granulomas.  Consultation for sarcoidosis from primary.  In 2018 had multiple episodes of bloody diarrhea.  Urgent care diagnosed hemorrhoids.  In 2020 evaluated by GI and had 2 colonoscopy.  Colonoscopy biopsy showed non caseating granulomas in the ileum and colon.  Lab showed positive Saccharomyces cerevisae IgG and IgM suggestive of Crohns.    Referred to pulmonary Dr. Shepherd who evaluated patient with x-ray, PFTs and CT chest.  No changes of sarcoid noted but pulmonary suspects CP.  Since 2017 has had a "knot" in his chest that causes intermittent CP worse with leaning back or breathing in.  Improves with steroid from primary.  Denies dysphagia or dynophagia.  Swallowing study normal.   The chronic chest pain felt to be musculoskeletal and not cardiac after cardiology work up.  Pain in muscles and joints shoulders, lower back, hands and hips.  Pain up to 10/10 ache intermittent.  Sudden stiffness in back occurs.   Awakens him from sleep.  Improves with steroids.   Morning stiffness for all day at times.   Pain awakens him from sleep.  Alternating buttock pain.  Exercise helps pain.   Not able to get in car due to pain.    Sudden onset lower extremity weakness that occurs every few month since 20s and he will fall.     Lupus Review of Systems  Alopecia: no  Photosensitivity: no   Raynaud's: no  Oral or nasal ulcers: no  Rashes: no  No pleurisy or pericarditis. (now with pleuritic CP)  No seizures, psychosis, or stroke.  No venous or arterial clots.  Pregnancy hx (if applicable): N/A      INTERVAL HISTORY:    Injured eye cutting branch.   Did not go to PT due to confusion with scheduling.    Sinus doing well since surgery.     Allergist Dr. Cris Lima at [facility] .      Currently no pain.  No morning stiffness.       Review of Systems   Constitutional:  Negative for appetite change, chills, fever and unexpected weight change.   HENT:  Negative for mouth sores and trouble swallowing.         Allergies   Eyes:  Negative for redness.   Respiratory: Negative.  Negative for cough and shortness of breath.    Cardiovascular: Negative.  Negative for chest pain.   Gastrointestinal: Negative.  Negative for abdominal pain, constipation, diarrhea and vomiting.   Endocrine: Negative.    Genitourinary: Negative.  Negative for genital sores.   Musculoskeletal:  Negative for arthralgias.   Skin: Negative.  Negative for rash.   Allergic/Immunologic: Positive for environmental allergies.   Neurological:  Negative for headaches.   Hematological: Negative.  Does not bruise/bleed easily.   Psychiatric/Behavioral: Negative.           Objective:   /83   Pulse 70   Ht 6' 1" (1.854 m)   Wt 86.6 kg (191 lb)   BMI 25.20 kg/m²      Physical Exam   Constitutional: He is oriented to person, place, and time.   HENT:   Head: Normocephalic and atraumatic.   Eyes: Conjunctivae are normal.   Cardiovascular: Normal rate, regular rhythm and normal heart sounds.   Pulmonary/Chest: Effort normal and breath sounds normal.   Abdominal: Soft. Bowel sounds are normal.   Musculoskeletal:      Cervical back: Normal range of motion and neck supple.      Comments: Schober 4 cm  Chest expansion 4 cm  No pain bilateral SI joints  Occiput to wall- able to touch occiput to wall     Neurological: He is alert and oriented to person, place, and time. Gait normal.   Skin: Skin is warm and dry.   Psychiatric: Mood and affect normal.       LABS    Component      Latest Ref Rng & Units 8/14/2023 8/14/2023 8/14/2023          10:16 AM 10:16 AM 10:16 AM   WBC      3.90 - 12.70 K/uL   4.96   RBC      4.60 - 6.20 M/uL   5.20   Hemoglobin      14.0 - 18.0 g/dL   15.3   Hematocrit      40.0 - 54.0 %   46.3 "   MCV      82 - 98 fL   89   MCH      27.0 - 31.0 pg   29.4   MCHC      32.0 - 36.0 g/dL   33.0   RDW      11.5 - 14.5 %   13.5   Platelets      150 - 450 K/uL   329   MPV      9.2 - 12.9 fL   10.4   Immature Granulocytes      0.0 - 0.5 %   0.2   Gran # (ANC)      1.8 - 7.7 K/uL   1.1 (L)   Immature Grans (Abs)      0.00 - 0.04 K/uL   0.01   Lymph #      1.0 - 4.8 K/uL   3.0   Mono #      0.3 - 1.0 K/uL   0.5   Eos #      0.0 - 0.5 K/uL   0.4   Baso #      0.00 - 0.20 K/uL   0.05   nRBC      0 /100 WBC   0   Gran %      38.0 - 73.0 %   21.7 (L)   Lymph %      18.0 - 48.0 %   59.5 (H)   Mono %      4.0 - 15.0 %   9.9   Eosinophil %      0.0 - 8.0 %   7.7   Basophil %      0.0 - 1.9 %   1.0   Differential Method         Automated   Sodium      136 - 145 mmol/L   140   Potassium      3.5 - 5.1 mmol/L   4.3   Chloride      95 - 110 mmol/L   102   CO2      23 - 29 mmol/L   29   Glucose      70 - 110 mg/dL   74   BUN      6 - 20 mg/dL   7   Creatinine      0.5 - 1.4 mg/dL   0.8   Calcium      8.7 - 10.5 mg/dL   9.5   PROTEIN TOTAL      6.0 - 8.4 g/dL   7.5   Albumin      3.5 - 5.2 g/dL   4.1   BILIRUBIN TOTAL      0.1 - 1.0 mg/dL   0.6   Alkaline Phosphatase      55 - 135 U/L   76   AST      10 - 40 U/L   18   ALT      10 - 44 U/L   16   eGFR      >60 mL/min/1.73 m:2   >60.0   Anion Gap      8 - 16 mmol/L   9   Hep B S Ab      mIU/mL Reactive 48.73    Sed Rate      0 - 23 mm/Hr   8   CRP      0.0 - 8.2 mg/L   <0.3   Hepatitis B Surface Ag      Non-reactive   Non-reactive   Hep B Core Total Ab      Non-reactive   Non-reactive   Hepatitis C Ab      Non-reactive   Non-reactive       Assessment:       1.  Ankylosing spondylitis.  Musculoskeletal Chest and back pain.  Abnormal occiput to wall, chest wall expansion, Schobers and SI joint pain along with inflammatory back pain.   Now doing well.   2.  Sarcoidosis of GI tract.  Will need to be followed  3.  Positive antibody for Crohns  4.  Myalgias  5.  Joint pains  6.   Fatigue  7.  Childhood asthma  8.  Childhood allergies  9.  Rash on neck.  Ointment from primary  10. Left 4th toe nail fungus.  Topical treatment from primary.  11. Recurrent sinusitis  12. Right 1st IP joint pain    DDX  Ankylosing spondylitis, Crohns, sarcoidosis  Plan:       1.  Continue Humira.  2.  Check labs   3.  Follow with ENT  4.  Restart PT to get back range of motion he obtained when patient ready  5.  OTC voltaren gel.  Flex and straighten thumb to maintain mobility.       RTO in 6 months/prn

## 2023-08-22 NOTE — PROGRESS NOTES
Rapid3 Question Responses and Scores 8/19/2023   MDHAQ Score 0   Psychologic Score 0   Pain Score 0   When you awakened in the morning OVER THE LAST WEEK, did you feel stiff? No   If Yes, please indicate the number of hours until you are as limber as you will be for the day -   Fatigue Score 0   Global Health Score 0   RAPID3 Score 0     Answers submitted by the patient for this visit:  Rheumatology Questionnaire (Submitted on 8/19/2023)  fever: No  eye redness: No  mouth sores: No  headaches: No  shortness of breath: No  chest pain: No  trouble swallowing: No  diarrhea: No  constipation: No  unexpected weight change: No  genital sore: No  During the last 3 days, have you had a skin rash?: No  Bruises or bleeds easily: No  cough: No

## 2023-08-31 ENCOUNTER — PATIENT MESSAGE (OUTPATIENT)
Dept: RHEUMATOLOGY | Facility: CLINIC | Age: 38
End: 2023-08-31
Payer: MEDICAID

## 2023-09-12 ENCOUNTER — SPECIALTY PHARMACY (OUTPATIENT)
Dept: PHARMACY | Facility: CLINIC | Age: 38
End: 2023-09-12
Payer: MEDICAID

## 2023-09-12 NOTE — TELEPHONE ENCOUNTER
Outgoing call: pt stated he is due to inject on 9/21, informed pt a PA is needed, once approved [facility] will follow up will pt to schedule his pickup, routing to [facility]

## 2023-09-14 NOTE — TELEPHONE ENCOUNTER
Specialty Pharmacy - Refill Coordination    Specialty Medication Orders Linked to Encounter      Flowsheet Row Most Recent Value   Medication #1 adalimumab (HUMIRA,CF, PEN) 40 mg/0.4 mL PnKt (Order#[id number], Rx#[id number])            Refill Questions - Documented Responses      Flowsheet Row Most Recent Value   Patient Availability and HIPAA Verification    Does patient want to proceed with activity? Yes   HIPAA/medical authority confirmed? Yes   Relationship to patient of person spoken to? Self   Refill Screening Questions    Changes to allergies? No   Changes to medications? No   New conditions since last clinic visit? No   Unplanned office visit, urgent care, ED, or hospital admission in the last 4 weeks? No   How does patient/caregiver feel medication is working? Good   Financial problems or insurance changes? No   How many doses of your specialty medications were missed in the last 4 weeks? 0   Would patient like to speak to a pharmacist? No   When does the patient need to receive the medication? 23   Refill Delivery Questions    How will the patient receive the medication? Pickup   When does the patient need to receive the medication? 23   Address in [facility] confirmed and updated if neccessary? No   Expected Copay ($) 0   Is the patient able to afford the medication copay? Yes   Payment Method zero copay   Days supply of Refill 28   Supplies needed? No supplies needed   Refill activity completed? Yes   Refill activity plan Refill scheduled   Shipment/Pickup Date: 23            Current Outpatient Medications   Medication Sig    adalimumab (HUMIRA,CF, PEN) 40 mg/0.4 mL PnKt Inject 0.4 mL (40 mg total) into the skin every 14 (fourteen) days.    budesonide (PULMICORT) 0.5 mg/2 mL nebulizer solution SMARTSI Packet(s) Both Nares Twice Daily    EPINEPHrine (EPIPEN) 0.3 mg/0.3 mL AtIn INJ 1 PEN SC PRN UTD    ergocalciferol (ERGOCALCIFEROL) 50,000 unit Cap Take 50,000 Units by mouth  every 7 days.    erythromycin (ROMYCIN) ophthalmic ointment Place a 1/2 inch ribbon of ointment into the lower eyelid.    fluticasone propionate (FLONASE) 50 mcg/actuation nasal spray SHAKE LQ AND U 1 SPR IEN BID    triamcinolone acetonide 0.1% (KENALOG) 0.1 % cream APPLY THIN LAYER TOPICALLY TO THE AFFECTED AREA TWICE DAILY   Last reviewed on 8/22/2023  9:14 AM by Josefa Pastrana MD    Review of patient's allergies indicates:   Allergen Reactions    Dupixent pen [dupilumab] Other (See Comments)     The patient claims to have flu like symptoms after injection    Tramadol Itching    Celery (apium graveolens) (umbelliferae)     Chicken derived     Corn     Grass pollen-june grass standard      TREE POLLEN    BRADEN GRASS     Milk      ALL DAIRY     Peanut      WALNUTS PECANS     Shrimp     Soy     Wheat     Last reviewed on  8/22/2023 9:14 AM by Josefa Pastrana      Tasks added this encounter   9/13/2024 - Benefits Review (Annual recurrence)   Tasks due within next 3 months   12/3/2023 - Clinical Assessment (1 year recurrence)  9/14/2023 - Refill Coordination Outreach (1 time occurrence)     [facility]  [address] [place], [place] [address]  [facility] [address]  Phone: [phone]  Fax: [phone]

## 2023-11-24 ENCOUNTER — LAB VISIT (OUTPATIENT)
Dept: LAB | Facility: HOSPITAL | Age: 38
End: 2023-11-24
Attending: INTERNAL MEDICINE
Payer: MEDICAID

## 2023-11-24 DIAGNOSIS — Z79.899 IMMUNOSUPPRESSION DUE TO DRUG THERAPY: ICD-10-CM

## 2023-11-24 DIAGNOSIS — M45.8 ANKYLOSING SPONDYLITIS OF SACRAL REGION: ICD-10-CM

## 2023-11-24 DIAGNOSIS — D84.821 IMMUNOSUPPRESSION DUE TO DRUG THERAPY: ICD-10-CM

## 2023-11-24 LAB
ALBUMIN SERPL BCP-MCNC: 3.9 G/DL (ref 3.5–5.2)
ALP SERPL-CCNC: 81 U/L (ref 55–135)
ALT SERPL W/O P-5'-P-CCNC: 13 U/L (ref 10–44)
ANION GAP SERPL CALC-SCNC: 9 MMOL/L (ref 8–16)
AST SERPL-CCNC: 19 U/L (ref 10–40)
BASOPHILS # BLD AUTO: 0.07 K/UL (ref 0–0.2)
BASOPHILS NFR BLD: 1.1 % (ref 0–1.9)
BILIRUB SERPL-MCNC: 0.5 MG/DL (ref 0.1–1)
BUN SERPL-MCNC: 9 MG/DL (ref 6–20)
CALCIUM SERPL-MCNC: 9.8 MG/DL (ref 8.7–10.5)
CHLORIDE SERPL-SCNC: 103 MMOL/L (ref 95–110)
CO2 SERPL-SCNC: 29 MMOL/L (ref 23–29)
CREAT SERPL-MCNC: 0.8 MG/DL (ref 0.5–1.4)
CRP SERPL-MCNC: 1.9 MG/L (ref 0–8.2)
DIFFERENTIAL METHOD: ABNORMAL
EOSINOPHIL # BLD AUTO: 0.4 K/UL (ref 0–0.5)
EOSINOPHIL NFR BLD: 6.7 % (ref 0–8)
ERYTHROCYTE [DISTWIDTH] IN BLOOD BY AUTOMATED COUNT: 14.1 % (ref 11.5–14.5)
ERYTHROCYTE [SEDIMENTATION RATE] IN BLOOD BY PHOTOMETRIC METHOD: 14 MM/HR (ref 0–23)
EST. GFR  (NO RACE VARIABLE): >60 ML/MIN/1.73 M^2
GLUCOSE SERPL-MCNC: 74 MG/DL (ref 70–110)
HCT VFR BLD AUTO: 43.7 % (ref 40–54)
HGB BLD-MCNC: 14.6 G/DL (ref 14–18)
IMM GRANULOCYTES # BLD AUTO: 0.01 K/UL (ref 0–0.04)
IMM GRANULOCYTES NFR BLD AUTO: 0.2 % (ref 0–0.5)
LYMPHOCYTES # BLD AUTO: 3.3 K/UL (ref 1–4.8)
LYMPHOCYTES NFR BLD: 52.6 % (ref 18–48)
MCH RBC QN AUTO: 29.9 PG (ref 27–31)
MCHC RBC AUTO-ENTMCNC: 33.4 G/DL (ref 32–36)
MCV RBC AUTO: 89 FL (ref 82–98)
MONOCYTES # BLD AUTO: 0.6 K/UL (ref 0.3–1)
MONOCYTES NFR BLD: 9.6 % (ref 4–15)
NEUTROPHILS # BLD AUTO: 1.9 K/UL (ref 1.8–7.7)
NEUTROPHILS NFR BLD: 29.8 % (ref 38–73)
NRBC BLD-RTO: 0 /100 WBC
PLATELET # BLD AUTO: 352 K/UL (ref 150–450)
PMV BLD AUTO: 10.4 FL (ref 9.2–12.9)
POTASSIUM SERPL-SCNC: 4 MMOL/L (ref 3.5–5.1)
PROT SERPL-MCNC: 7.4 G/DL (ref 6–8.4)
RBC # BLD AUTO: 4.89 M/UL (ref 4.6–6.2)
SODIUM SERPL-SCNC: 141 MMOL/L (ref 136–145)
WBC # BLD AUTO: 6.23 K/UL (ref 3.9–12.7)

## 2023-11-24 PROCEDURE — 85652 RBC SED RATE AUTOMATED: CPT | Performed by: INTERNAL MEDICINE

## 2023-11-24 PROCEDURE — 80053 COMPREHEN METABOLIC PANEL: CPT | Performed by: INTERNAL MEDICINE

## 2023-11-24 PROCEDURE — 36415 COLL VENOUS BLD VENIPUNCTURE: CPT | Performed by: INTERNAL MEDICINE

## 2023-11-24 PROCEDURE — 86140 C-REACTIVE PROTEIN: CPT | Performed by: INTERNAL MEDICINE

## 2023-11-24 PROCEDURE — 85025 COMPLETE CBC W/AUTO DIFF WBC: CPT | Performed by: INTERNAL MEDICINE

## 2023-12-12 DIAGNOSIS — R53.83 FATIGUE, UNSPECIFIED TYPE: ICD-10-CM

## 2023-12-12 DIAGNOSIS — M45.8 ANKYLOSING SPONDYLITIS OF SACRAL REGION: ICD-10-CM

## 2023-12-12 DIAGNOSIS — D84.9 IMMUNOSUPPRESSION: ICD-10-CM

## 2023-12-12 DIAGNOSIS — D86.89 SARCOIDOSIS OF DIGESTIVE SYSTEM: ICD-10-CM

## 2023-12-12 DIAGNOSIS — M79.10 MYALGIA: ICD-10-CM

## 2023-12-12 RX ORDER — ADALIMUMAB 40MG/0.4ML
40 KIT SUBCUTANEOUS
Qty: 2 PEN | Refills: 2 | Status: ACTIVE | OUTPATIENT
Start: 2023-12-12 | End: 2024-03-05

## 2024-01-23 ENCOUNTER — LAB VISIT (OUTPATIENT)
Dept: LAB | Facility: HOSPITAL | Age: 39
End: 2024-01-23
Attending: INTERNAL MEDICINE
Payer: MEDICAID

## 2024-01-23 DIAGNOSIS — M45.8 ANKYLOSING SPONDYLITIS OF SACRAL REGION: ICD-10-CM

## 2024-01-23 DIAGNOSIS — Z79.899 IMMUNOSUPPRESSION DUE TO DRUG THERAPY: ICD-10-CM

## 2024-01-23 DIAGNOSIS — D84.821 IMMUNOSUPPRESSION DUE TO DRUG THERAPY: ICD-10-CM

## 2024-01-23 LAB
ALBUMIN SERPL BCP-MCNC: 4 G/DL (ref 3.5–5.2)
ALP SERPL-CCNC: 81 U/L (ref 55–135)
ALT SERPL W/O P-5'-P-CCNC: 29 U/L (ref 10–44)
ANION GAP SERPL CALC-SCNC: 8 MMOL/L (ref 8–16)
AST SERPL-CCNC: 26 U/L (ref 10–40)
BASOPHILS # BLD AUTO: 0.07 K/UL (ref 0–0.2)
BASOPHILS NFR BLD: 1.2 % (ref 0–1.9)
BILIRUB SERPL-MCNC: 0.4 MG/DL (ref 0.1–1)
BUN SERPL-MCNC: 8 MG/DL (ref 6–20)
CALCIUM SERPL-MCNC: 9 MG/DL (ref 8.7–10.5)
CHLORIDE SERPL-SCNC: 105 MMOL/L (ref 95–110)
CO2 SERPL-SCNC: 28 MMOL/L (ref 23–29)
CREAT SERPL-MCNC: 0.8 MG/DL (ref 0.5–1.4)
CRP SERPL-MCNC: 0.3 MG/L (ref 0–8.2)
DIFFERENTIAL METHOD BLD: ABNORMAL
EOSINOPHIL # BLD AUTO: 0.4 K/UL (ref 0–0.5)
EOSINOPHIL NFR BLD: 6.9 % (ref 0–8)
ERYTHROCYTE [DISTWIDTH] IN BLOOD BY AUTOMATED COUNT: 14.3 % (ref 11.5–14.5)
ERYTHROCYTE [SEDIMENTATION RATE] IN BLOOD BY PHOTOMETRIC METHOD: 5 MM/HR (ref 0–23)
EST. GFR  (NO RACE VARIABLE): >60 ML/MIN/1.73 M^2
GLUCOSE SERPL-MCNC: 87 MG/DL (ref 70–110)
HCT VFR BLD AUTO: 43 % (ref 40–54)
HGB BLD-MCNC: 14 G/DL (ref 14–18)
IMM GRANULOCYTES # BLD AUTO: 0 K/UL (ref 0–0.04)
IMM GRANULOCYTES NFR BLD AUTO: 0 % (ref 0–0.5)
LYMPHOCYTES # BLD AUTO: 3.1 K/UL (ref 1–4.8)
LYMPHOCYTES NFR BLD: 54.4 % (ref 18–48)
MCH RBC QN AUTO: 29 PG (ref 27–31)
MCHC RBC AUTO-ENTMCNC: 32.6 G/DL (ref 32–36)
MCV RBC AUTO: 89 FL (ref 82–98)
MONOCYTES # BLD AUTO: 0.7 K/UL (ref 0.3–1)
MONOCYTES NFR BLD: 12.5 % (ref 4–15)
NEUTROPHILS # BLD AUTO: 1.4 K/UL (ref 1.8–7.7)
NEUTROPHILS NFR BLD: 25 % (ref 38–73)
NRBC BLD-RTO: 0 /100 WBC
PLATELET # BLD AUTO: 288 K/UL (ref 150–450)
PMV BLD AUTO: 10.8 FL (ref 9.2–12.9)
POTASSIUM SERPL-SCNC: 3.8 MMOL/L (ref 3.5–5.1)
PROT SERPL-MCNC: 7.1 G/DL (ref 6–8.4)
RBC # BLD AUTO: 4.82 M/UL (ref 4.6–6.2)
SODIUM SERPL-SCNC: 141 MMOL/L (ref 136–145)
WBC # BLD AUTO: 5.62 K/UL (ref 3.9–12.7)

## 2024-01-23 PROCEDURE — 80053 COMPREHEN METABOLIC PANEL: CPT | Performed by: INTERNAL MEDICINE

## 2024-01-23 PROCEDURE — 36415 COLL VENOUS BLD VENIPUNCTURE: CPT | Performed by: INTERNAL MEDICINE

## 2024-01-23 PROCEDURE — 85652 RBC SED RATE AUTOMATED: CPT | Performed by: INTERNAL MEDICINE

## 2024-01-23 PROCEDURE — 85025 COMPLETE CBC W/AUTO DIFF WBC: CPT | Performed by: INTERNAL MEDICINE

## 2024-01-23 PROCEDURE — 86140 C-REACTIVE PROTEIN: CPT | Performed by: INTERNAL MEDICINE

## 2024-01-24 ENCOUNTER — PATIENT MESSAGE (OUTPATIENT)
Dept: RHEUMATOLOGY | Facility: CLINIC | Age: 39
End: 2024-01-24
Payer: MEDICAID

## 2024-03-11 ENCOUNTER — OFFICE VISIT (OUTPATIENT)
Dept: RHEUMATOLOGY | Facility: CLINIC | Age: 39
End: 2024-03-11
Payer: MEDICAID

## 2024-03-11 VITALS
HEART RATE: 66 BPM | WEIGHT: 205 LBS | HEIGHT: 72 IN | BODY MASS INDEX: 27.77 KG/M2 | DIASTOLIC BLOOD PRESSURE: 90 MMHG | SYSTOLIC BLOOD PRESSURE: 139 MMHG

## 2024-03-11 DIAGNOSIS — M45.8 ANKYLOSING SPONDYLITIS OF SACRAL REGION: Primary | ICD-10-CM

## 2024-03-11 DIAGNOSIS — Z79.899 IMMUNOSUPPRESSION DUE TO DRUG THERAPY: ICD-10-CM

## 2024-03-11 DIAGNOSIS — D84.821 IMMUNOSUPPRESSION DUE TO DRUG THERAPY: ICD-10-CM

## 2024-03-11 PROCEDURE — 3080F DIAST BP >= 90 MM HG: CPT | Mod: CPTII,,, | Performed by: INTERNAL MEDICINE

## 2024-03-11 PROCEDURE — 99214 OFFICE O/P EST MOD 30 MIN: CPT | Mod: S$PBB,,, | Performed by: INTERNAL MEDICINE

## 2024-03-11 PROCEDURE — 99213 OFFICE O/P EST LOW 20 MIN: CPT | Mod: PBBFAC | Performed by: INTERNAL MEDICINE

## 2024-03-11 PROCEDURE — 99999 PR PBB SHADOW E&M-EST. PATIENT-LVL III: CPT | Mod: PBBFAC,,, | Performed by: INTERNAL MEDICINE

## 2024-03-11 PROCEDURE — 1160F RVW MEDS BY RX/DR IN RCRD: CPT | Mod: CPTII,,, | Performed by: INTERNAL MEDICINE

## 2024-03-11 PROCEDURE — 1159F MED LIST DOCD IN RCRD: CPT | Mod: CPTII,,, | Performed by: INTERNAL MEDICINE

## 2024-03-11 PROCEDURE — 3075F SYST BP GE 130 - 139MM HG: CPT | Mod: CPTII,,, | Performed by: INTERNAL MEDICINE

## 2024-03-11 PROCEDURE — 3008F BODY MASS INDEX DOCD: CPT | Mod: CPTII,,, | Performed by: INTERNAL MEDICINE

## 2024-03-11 NOTE — PROGRESS NOTES
3/10/2024    10:31 AM   Rapid3 Question Responses and Scores   MDHAQ Score 0   Psychologic Score 0   Pain Score 3   When you awakened in the morning OVER THE LAST WEEK, did you feel stiff? No   Fatigue Score 0   Global Health Score 0   RAPID3 Score 1     Answers submitted by the patient for this visit:  Rheumatology Questionnaire (Submitted on 3/10/2024)  fever: No  eye redness: No  mouth sores: No  headaches: No  shortness of breath: No  chest pain: No  trouble swallowing: No  diarrhea: No  constipation: No  unexpected weight change: No  genital sore: No  During the last 3 days, have you had a skin rash?: No  Bruises or bleeds easily: No  cough: No

## 2024-03-11 NOTE — PROGRESS NOTES
"Subjective:       Patient ID: Darshana Flor is a 38 y.o. male.    Chief Complaint: ankylosing spondylitis    HPI:  Darshana Flor is a 38 y.o. male with history of asthma diagnosed with sarcoidosis based on colonic biopsy with noncaseating granulomas.  Consultation for sarcoidosis from primary.  In 2018 had multiple episodes of bloody diarrhea.  Urgent care diagnosed hemorrhoids.  In 2020 evaluated by GI and had 2 colonoscopy.  Colonoscopy biopsy showed non caseating granulomas in the ileum and colon.  Lab showed positive Saccharomyces cerevisae IgG and IgM suggestive of Crohns.    Referred to pulmonary Dr. Shepherd who evaluated patient with x-ray, PFTs and CT chest.  No changes of sarcoid noted but pulmonary suspects CP.  Since 2017 has had a "knot" in his chest that causes intermittent CP worse with leaning back or breathing in.  Improves with steroid from primary.  Denies dysphagia or dynophagia.  Swallowing study normal.   The chronic chest pain felt to be musculoskeletal and not cardiac after cardiology work up.  Pain in muscles and joints shoulders, lower back, hands and hips.  Pain up to 10/10 ache intermittent.  Sudden stiffness in back occurs.   Awakens him from sleep.  Improves with steroids.   Morning stiffness for all day at times.   Pain awakens him from sleep.  Alternating buttock pain.  Exercise helps pain.   Not able to get in car due to pain.    Sudden onset lower extremity weakness that occurs every few month since 20s and he will fall.     Lupus Review of Systems  Alopecia: no  Photosensitivity: no   Raynaud's: no  Oral or nasal ulcers: no  Rashes: no  No pleurisy or pericarditis. (now with pleuritic CP)  No seizures, psychosis, or stroke.  No venous or arterial clots.  Pregnancy hx (if applicable): N/A      INTERVAL HISTORY:    Doing well but does not exercise like he should.   Sinus doing well since surgery.   Had 2 flare ups that improved with nasal spray that he was given.   Allergist " Dr. Cris Lima at [facility] .      Currently no pain.  No morning stiffness.   Patient notes that he has spread Humira to monthly since he is doing well and concern may be increasing sinus infection.        Review of Systems   Constitutional:  Negative for appetite change, chills, fever and unexpected weight change.   HENT:  Negative for mouth sores and trouble swallowing.         Allergies   Eyes:  Negative for redness.   Respiratory: Negative.  Negative for cough and shortness of breath.    Cardiovascular: Negative.  Negative for chest pain.   Gastrointestinal: Negative.  Negative for abdominal pain, constipation, diarrhea and vomiting.   Endocrine: Negative.    Genitourinary: Negative.  Negative for genital sores.   Musculoskeletal:  Negative for arthralgias.   Skin: Negative.  Negative for rash.   Allergic/Immunologic: Positive for environmental allergies.   Neurological:  Negative for headaches.   Hematological: Negative.  Does not bruise/bleed easily.   Psychiatric/Behavioral: Negative.           Objective:   BP (!) 139/90   Pulse 66   Ht 6' (1.829 m)   Wt 93 kg (205 lb 0.4 oz)   BMI 27.81 kg/m²      Physical Exam   Constitutional: He is oriented to person, place, and time.   HENT:   Head: Normocephalic and atraumatic.   Eyes: Conjunctivae are normal.   Cardiovascular: Normal rate, regular rhythm and normal heart sounds.   Pulmonary/Chest: Effort normal and breath sounds normal.   Abdominal: Soft. Bowel sounds are normal.   Musculoskeletal:      Cervical back: Normal range of motion and neck supple.      Comments: Schober 3.5 cm  Chest expansion 3.5 cm  No pain bilateral SI joints  Occiput to wall- able to touch occiput to wall     Neurological: He is alert and oriented to person, place, and time. Gait normal.   Skin: Skin is warm and dry.   Psychiatric: Mood and affect normal.       LABS    Component      Latest Ref Rng 1/23/2024   WBC      3.90 - 12.70 K/uL 5.62    RBC       4.60 - 6.20 M/uL 4.82    Hemoglobin      14.0 - 18.0 g/dL 14.0    Hematocrit      40.0 - 54.0 % 43.0    MCV      82 - 98 fL 89    MCH      27.0 - 31.0 pg 29.0    MCHC      32.0 - 36.0 g/dL 32.6    RDW      11.5 - 14.5 % 14.3    Platelet Count      150 - 450 K/uL 288    MPV      9.2 - 12.9 fL 10.8    Immature Granulocytes      0.0 - 0.5 % 0.0    Gran # (ANC)      1.8 - 7.7 K/uL 1.4 (L)    Immature Grans (Abs)      0.00 - 0.04 K/uL 0.00    Lymph #      1.0 - 4.8 K/uL 3.1    Mono #      0.3 - 1.0 K/uL 0.7    Eos #      0.0 - 0.5 K/uL 0.4    Baso #      0.00 - 0.20 K/uL 0.07    nRBC      0 /100 WBC 0    Gran %      38.0 - 73.0 % 25.0 (L)    Lymph %      18.0 - 48.0 % 54.4 (H)    Mono %      4.0 - 15.0 % 12.5    Eos %      0.0 - 8.0 % 6.9    Basophil %      0.0 - 1.9 % 1.2    Differential Method Automated    Sodium      136 - 145 mmol/L 141    Potassium      3.5 - 5.1 mmol/L 3.8    Chloride      95 - 110 mmol/L 105    CO2      23 - 29 mmol/L 28    Glucose      70 - 110 mg/dL 87    BUN      6 - 20 mg/dL 8    Creatinine      0.5 - 1.4 mg/dL 0.8    Calcium      8.7 - 10.5 mg/dL 9.0    PROTEIN TOTAL      6.0 - 8.4 g/dL 7.1    Albumin      3.5 - 5.2 g/dL 4.0    BILIRUBIN TOTAL      0.1 - 1.0 mg/dL 0.4    ALP      55 - 135 U/L 81    AST      10 - 40 U/L 26    ALT      10 - 44 U/L 29    eGFR      >60 mL/min/1.73 m^2 >60.0    Anion Gap      8 - 16 mmol/L 8    Sed Rate      0 - 23 mm/Hr 5    CRP      0.0 - 8.2 mg/L 0.3       Legend:  (L) Low  (H) High    Assessment:       1.  Ankylosing spondylitis.  Musculoskeletal Chest and back pain.  Abnormal occiput to wall, chest wall expansion, Schobers and SI joint pain along with inflammatory back pain.   Now with decreased chest wall expansion and Schober.   2.  Sarcoidosis of GI tract.  Will need to be followed  3.  Positive antibody for Crohns  4.  Myalgias  5.  Joint pains  6.  Fatigue  7.  Childhood asthma  8.  Childhood allergies  9.  Rash on neck.  Ointment from primary  10. Left  4th toe nail fungus.  Topical treatment from primary.  11. Recurrent sinusitis  12. Right 1st IP joint pain    DDX  Ankylosing spondylitis, Crohns, sarcoidosis  Plan:       1.  Continue Humira.  Patient taking monthly instead of every 14 days.  This may be causing decrease in ROM.  Patient encouraged to consider restarting every 14 days.  2.  Check labs   3.  Follow with ENT  4.  Restart exercises PT to get back range of motion he obtained when patient ready  5.  OTC voltaren gel.  Flex and straighten thumb to maintain mobility.       RTO in 6 months/prn

## 2024-03-12 ENCOUNTER — PATIENT MESSAGE (OUTPATIENT)
Dept: RHEUMATOLOGY | Facility: CLINIC | Age: 39
End: 2024-03-12
Payer: MEDICAID

## 2024-03-12 ENCOUNTER — LAB VISIT (OUTPATIENT)
Dept: LAB | Facility: HOSPITAL | Age: 39
End: 2024-03-12
Attending: INTERNAL MEDICINE
Payer: MEDICAID

## 2024-03-12 DIAGNOSIS — Z79.899 IMMUNOSUPPRESSION DUE TO DRUG THERAPY: ICD-10-CM

## 2024-03-12 DIAGNOSIS — D84.821 IMMUNOSUPPRESSION DUE TO DRUG THERAPY: ICD-10-CM

## 2024-03-12 DIAGNOSIS — M45.8 ANKYLOSING SPONDYLITIS OF SACRAL REGION: ICD-10-CM

## 2024-03-12 LAB
ALBUMIN SERPL BCP-MCNC: 4.2 G/DL (ref 3.5–5.2)
ALP SERPL-CCNC: 78 U/L (ref 55–135)
ALT SERPL W/O P-5'-P-CCNC: 16 U/L (ref 10–44)
ANION GAP SERPL CALC-SCNC: 9 MMOL/L (ref 8–16)
AST SERPL-CCNC: 18 U/L (ref 10–40)
BASOPHILS # BLD AUTO: 0.06 K/UL (ref 0–0.2)
BASOPHILS NFR BLD: 1.3 % (ref 0–1.9)
BILIRUB SERPL-MCNC: 0.5 MG/DL (ref 0.1–1)
BUN SERPL-MCNC: 7 MG/DL (ref 6–20)
CALCIUM SERPL-MCNC: 9.5 MG/DL (ref 8.7–10.5)
CHLORIDE SERPL-SCNC: 103 MMOL/L (ref 95–110)
CO2 SERPL-SCNC: 28 MMOL/L (ref 23–29)
CREAT SERPL-MCNC: 0.8 MG/DL (ref 0.5–1.4)
CRP SERPL-MCNC: 0.5 MG/L (ref 0–8.2)
DIFFERENTIAL METHOD BLD: ABNORMAL
EOSINOPHIL # BLD AUTO: 0.3 K/UL (ref 0–0.5)
EOSINOPHIL NFR BLD: 5.7 % (ref 0–8)
ERYTHROCYTE [DISTWIDTH] IN BLOOD BY AUTOMATED COUNT: 13.7 % (ref 11.5–14.5)
ERYTHROCYTE [SEDIMENTATION RATE] IN BLOOD BY PHOTOMETRIC METHOD: 9 MM/HR (ref 0–23)
EST. GFR  (NO RACE VARIABLE): >60 ML/MIN/1.73 M^2
GLUCOSE SERPL-MCNC: 73 MG/DL (ref 70–110)
HBV CORE AB SERPL QL IA: NORMAL
HBV SURFACE AB SER-ACNC: 43.26 MIU/ML
HBV SURFACE AB SER-ACNC: REACTIVE M[IU]/ML
HBV SURFACE AG SERPL QL IA: NORMAL
HCT VFR BLD AUTO: 46.2 % (ref 40–54)
HCV AB SERPL QL IA: NORMAL
HGB BLD-MCNC: 14.9 G/DL (ref 14–18)
IMM GRANULOCYTES # BLD AUTO: 0.01 K/UL (ref 0–0.04)
IMM GRANULOCYTES NFR BLD AUTO: 0.2 % (ref 0–0.5)
LYMPHOCYTES # BLD AUTO: 2.8 K/UL (ref 1–4.8)
LYMPHOCYTES NFR BLD: 60.2 % (ref 18–48)
MCH RBC QN AUTO: 29.2 PG (ref 27–31)
MCHC RBC AUTO-ENTMCNC: 32.3 G/DL (ref 32–36)
MCV RBC AUTO: 91 FL (ref 82–98)
MONOCYTES # BLD AUTO: 0.5 K/UL (ref 0.3–1)
MONOCYTES NFR BLD: 11.2 % (ref 4–15)
NEUTROPHILS # BLD AUTO: 1 K/UL (ref 1.8–7.7)
NEUTROPHILS NFR BLD: 21.4 % (ref 38–73)
NRBC BLD-RTO: 0 /100 WBC
PLATELET # BLD AUTO: 319 K/UL (ref 150–450)
PMV BLD AUTO: 11 FL (ref 9.2–12.9)
POTASSIUM SERPL-SCNC: 4.2 MMOL/L (ref 3.5–5.1)
PROT SERPL-MCNC: 7.7 G/DL (ref 6–8.4)
RBC # BLD AUTO: 5.1 M/UL (ref 4.6–6.2)
SODIUM SERPL-SCNC: 140 MMOL/L (ref 136–145)
WBC # BLD AUTO: 4.72 K/UL (ref 3.9–12.7)

## 2024-03-12 PROCEDURE — 85652 RBC SED RATE AUTOMATED: CPT | Performed by: INTERNAL MEDICINE

## 2024-03-12 PROCEDURE — 86803 HEPATITIS C AB TEST: CPT | Performed by: INTERNAL MEDICINE

## 2024-03-12 PROCEDURE — 87340 HEPATITIS B SURFACE AG IA: CPT | Performed by: INTERNAL MEDICINE

## 2024-03-12 PROCEDURE — 86706 HEP B SURFACE ANTIBODY: CPT | Performed by: INTERNAL MEDICINE

## 2024-03-12 PROCEDURE — 86704 HEP B CORE ANTIBODY TOTAL: CPT | Performed by: INTERNAL MEDICINE

## 2024-03-12 PROCEDURE — 85025 COMPLETE CBC W/AUTO DIFF WBC: CPT | Performed by: INTERNAL MEDICINE

## 2024-03-12 PROCEDURE — 36415 COLL VENOUS BLD VENIPUNCTURE: CPT | Performed by: INTERNAL MEDICINE

## 2024-03-12 PROCEDURE — 86140 C-REACTIVE PROTEIN: CPT | Performed by: INTERNAL MEDICINE

## 2024-03-12 PROCEDURE — 80053 COMPREHEN METABOLIC PANEL: CPT | Performed by: INTERNAL MEDICINE

## 2024-06-21 DIAGNOSIS — R53.83 FATIGUE, UNSPECIFIED TYPE: ICD-10-CM

## 2024-06-21 DIAGNOSIS — D86.89 SARCOIDOSIS OF DIGESTIVE SYSTEM: ICD-10-CM

## 2024-06-21 DIAGNOSIS — D84.9 IMMUNOSUPPRESSION: ICD-10-CM

## 2024-06-21 DIAGNOSIS — M79.10 MYALGIA: ICD-10-CM

## 2024-06-21 DIAGNOSIS — M45.8 ANKYLOSING SPONDYLITIS OF SACRAL REGION: ICD-10-CM

## 2024-06-22 RX ORDER — ADALIMUMAB 40MG/0.4ML
40 KIT SUBCUTANEOUS
Qty: 2 PEN | Refills: 2 | OUTPATIENT
Start: 2024-06-22 | End: 2024-09-14

## 2024-08-20 DIAGNOSIS — D84.9 IMMUNOSUPPRESSION: ICD-10-CM

## 2024-08-20 DIAGNOSIS — M45.8 ANKYLOSING SPONDYLITIS OF SACRAL REGION: ICD-10-CM

## 2024-08-20 DIAGNOSIS — D86.89 SARCOIDOSIS OF DIGESTIVE SYSTEM: ICD-10-CM

## 2024-08-20 DIAGNOSIS — M79.10 MYALGIA: ICD-10-CM

## 2024-08-20 DIAGNOSIS — R53.83 FATIGUE, UNSPECIFIED TYPE: ICD-10-CM

## 2024-08-21 RX ORDER — ADALIMUMAB 40MG/0.4ML
40 KIT SUBCUTANEOUS
Qty: 2 PEN | Refills: 2 | OUTPATIENT
Start: 2024-08-21 | End: 2024-11-13

## 2024-09-04 RX ORDER — ADALIMUMAB 40MG/0.4ML
40 KIT SUBCUTANEOUS
Qty: 2 PEN | Refills: 2 | OUTPATIENT
Start: 2024-09-04 | End: 2024-11-27

## 2024-09-16 ENCOUNTER — HOSPITAL ENCOUNTER (OUTPATIENT)
Dept: RADIOLOGY | Facility: HOSPITAL | Age: 39
Discharge: HOME | End: 2024-09-16
Attending: INTERNAL MEDICINE
Payer: MEDICAID

## 2024-09-16 DIAGNOSIS — R79.89 OTHER SPECIFIED ABNORMAL FINDINGS OF BLOOD CHEMISTRY: ICD-10-CM

## 2024-09-16 DIAGNOSIS — Z12.5 ENCOUNTER FOR SCREENING FOR MALIGNANT NEOPLASM OF PROSTATE: ICD-10-CM

## 2024-09-16 PROCEDURE — 76770 US EXAM ABDO BACK WALL COMP: CPT | Mod: 26,,, | Performed by: RADIOLOGY

## 2024-09-16 PROCEDURE — 76770 US EXAM ABDO BACK WALL COMP: CPT | Mod: TC

## 2024-09-18 ENCOUNTER — LAB VISIT (OUTPATIENT)
Dept: LAB | Facility: HOSPITAL | Age: 39
End: 2024-09-18
Attending: INTERNAL MEDICINE
Payer: MEDICAID

## 2024-09-18 ENCOUNTER — PATIENT MESSAGE (OUTPATIENT)
Dept: RHEUMATOLOGY | Facility: CLINIC | Age: 39
End: 2024-09-18
Payer: MEDICAID

## 2024-09-18 DIAGNOSIS — D84.821 IMMUNOSUPPRESSION DUE TO DRUG THERAPY: ICD-10-CM

## 2024-09-18 DIAGNOSIS — D86.89 SARCOIDOSIS OF DIGESTIVE SYSTEM: ICD-10-CM

## 2024-09-18 DIAGNOSIS — D84.9 IMMUNOSUPPRESSION: ICD-10-CM

## 2024-09-18 DIAGNOSIS — Z79.899 IMMUNOSUPPRESSION DUE TO DRUG THERAPY: ICD-10-CM

## 2024-09-18 DIAGNOSIS — R53.83 FATIGUE, UNSPECIFIED TYPE: ICD-10-CM

## 2024-09-18 DIAGNOSIS — M79.10 MYALGIA: ICD-10-CM

## 2024-09-18 DIAGNOSIS — M45.8 ANKYLOSING SPONDYLITIS OF SACRAL REGION: ICD-10-CM

## 2024-09-18 LAB
ALBUMIN SERPL BCP-MCNC: 4.1 G/DL (ref 3.5–5.2)
ALP SERPL-CCNC: 66 U/L (ref 55–135)
ALT SERPL W/O P-5'-P-CCNC: 13 U/L (ref 10–44)
ANION GAP SERPL CALC-SCNC: 6 MMOL/L (ref 8–16)
AST SERPL-CCNC: 17 U/L (ref 10–40)
BASOPHILS # BLD AUTO: 0.05 K/UL (ref 0–0.2)
BASOPHILS NFR BLD: 1.2 % (ref 0–1.9)
BILIRUB SERPL-MCNC: 0.9 MG/DL (ref 0.1–1)
BUN SERPL-MCNC: 8 MG/DL (ref 6–20)
CALCIUM SERPL-MCNC: 9.4 MG/DL (ref 8.7–10.5)
CHLORIDE SERPL-SCNC: 104 MMOL/L (ref 95–110)
CO2 SERPL-SCNC: 27 MMOL/L (ref 23–29)
CREAT SERPL-MCNC: 0.9 MG/DL (ref 0.5–1.4)
CRP SERPL-MCNC: 0.6 MG/L (ref 0–8.2)
DIFFERENTIAL METHOD BLD: ABNORMAL
EOSINOPHIL # BLD AUTO: 0.3 K/UL (ref 0–0.5)
EOSINOPHIL NFR BLD: 7.9 % (ref 0–8)
ERYTHROCYTE [DISTWIDTH] IN BLOOD BY AUTOMATED COUNT: 13.9 % (ref 11.5–14.5)
ERYTHROCYTE [SEDIMENTATION RATE] IN BLOOD BY PHOTOMETRIC METHOD: 17 MM/HR (ref 0–23)
EST. GFR  (NO RACE VARIABLE): >60 ML/MIN/1.73 M^2
GLUCOSE SERPL-MCNC: 103 MG/DL (ref 70–110)
HBV CORE AB SERPL QL IA: NORMAL
HBV SURFACE AB SER-ACNC: 33.29 MIU/ML
HBV SURFACE AB SER-ACNC: REACTIVE M[IU]/ML
HBV SURFACE AG SERPL QL IA: NORMAL
HCT VFR BLD AUTO: 47.2 % (ref 40–54)
HCV AB SERPL QL IA: NORMAL
HGB BLD-MCNC: 15.8 G/DL (ref 14–18)
IMM GRANULOCYTES # BLD AUTO: 0.01 K/UL (ref 0–0.04)
IMM GRANULOCYTES NFR BLD AUTO: 0.2 % (ref 0–0.5)
LYMPHOCYTES # BLD AUTO: 2.5 K/UL (ref 1–4.8)
LYMPHOCYTES NFR BLD: 58 % (ref 18–48)
MCH RBC QN AUTO: 29.4 PG (ref 27–31)
MCHC RBC AUTO-ENTMCNC: 33.5 G/DL (ref 32–36)
MCV RBC AUTO: 88 FL (ref 82–98)
MONOCYTES # BLD AUTO: 0.4 K/UL (ref 0.3–1)
MONOCYTES NFR BLD: 9.8 % (ref 4–15)
NEUTROPHILS # BLD AUTO: 1 K/UL (ref 1.8–7.7)
NEUTROPHILS NFR BLD: 22.9 % (ref 38–73)
NRBC BLD-RTO: 0 /100 WBC
PLATELET # BLD AUTO: 248 K/UL (ref 150–450)
PMV BLD AUTO: 11.4 FL (ref 9.2–12.9)
POTASSIUM SERPL-SCNC: 4.1 MMOL/L (ref 3.5–5.1)
PROT SERPL-MCNC: 7.2 G/DL (ref 6–8.4)
RBC # BLD AUTO: 5.37 M/UL (ref 4.6–6.2)
SODIUM SERPL-SCNC: 137 MMOL/L (ref 136–145)
WBC # BLD AUTO: 4.29 K/UL (ref 3.9–12.7)

## 2024-09-18 PROCEDURE — 86706 HEP B SURFACE ANTIBODY: CPT | Mod: 91 | Performed by: INTERNAL MEDICINE

## 2024-09-18 PROCEDURE — 85025 COMPLETE CBC W/AUTO DIFF WBC: CPT | Performed by: INTERNAL MEDICINE

## 2024-09-18 PROCEDURE — 86140 C-REACTIVE PROTEIN: CPT | Performed by: INTERNAL MEDICINE

## 2024-09-18 PROCEDURE — 85652 RBC SED RATE AUTOMATED: CPT | Performed by: INTERNAL MEDICINE

## 2024-09-18 PROCEDURE — 87340 HEPATITIS B SURFACE AG IA: CPT | Performed by: INTERNAL MEDICINE

## 2024-09-18 PROCEDURE — 80053 COMPREHEN METABOLIC PANEL: CPT | Performed by: INTERNAL MEDICINE

## 2024-09-18 PROCEDURE — 86803 HEPATITIS C AB TEST: CPT | Performed by: INTERNAL MEDICINE

## 2024-09-18 PROCEDURE — 86704 HEP B CORE ANTIBODY TOTAL: CPT | Performed by: INTERNAL MEDICINE

## 2024-09-18 PROCEDURE — 36415 COLL VENOUS BLD VENIPUNCTURE: CPT | Performed by: INTERNAL MEDICINE

## 2024-09-18 RX ORDER — ADALIMUMAB 40MG/0.4ML
40 KIT SUBCUTANEOUS
Qty: 2 PEN | Refills: 2 | Status: ACTIVE | OUTPATIENT
Start: 2024-09-18 | End: 2024-12-11

## 2024-09-23 ENCOUNTER — PATIENT MESSAGE (OUTPATIENT)
Dept: RHEUMATOLOGY | Facility: CLINIC | Age: 39
End: 2024-09-23
Payer: MEDICAID

## 2024-09-23 ENCOUNTER — TELEPHONE (OUTPATIENT)
Dept: RHEUMATOLOGY | Facility: CLINIC | Age: 39
End: 2024-09-23

## 2025-02-05 DIAGNOSIS — M45.8 ANKYLOSING SPONDYLITIS OF SACRAL REGION: Primary | ICD-10-CM

## 2025-02-26 DIAGNOSIS — M79.10 MYALGIA: ICD-10-CM

## 2025-02-26 DIAGNOSIS — M45.8 ANKYLOSING SPONDYLITIS OF SACRAL REGION: ICD-10-CM

## 2025-02-26 DIAGNOSIS — D84.9 IMMUNOSUPPRESSION: ICD-10-CM

## 2025-02-26 DIAGNOSIS — R53.83 FATIGUE, UNSPECIFIED TYPE: ICD-10-CM

## 2025-02-26 DIAGNOSIS — D86.89 SARCOIDOSIS OF DIGESTIVE SYSTEM: ICD-10-CM

## 2025-02-26 RX ORDER — ADALIMUMAB 40MG/0.4ML
40 KIT SUBCUTANEOUS
Qty: 2 PEN | Refills: 0 | Status: ACTIVE | OUTPATIENT
Start: 2025-02-26 | End: 2025-05-21

## 2025-03-12 ENCOUNTER — RESULTS FOLLOW-UP (OUTPATIENT)
Dept: RHEUMATOLOGY | Facility: CLINIC | Age: 40
End: 2025-03-12

## 2025-03-12 ENCOUNTER — LAB VISIT (OUTPATIENT)
Dept: LAB | Facility: HOSPITAL | Age: 40
End: 2025-03-12
Payer: MEDICAID

## 2025-03-12 DIAGNOSIS — M45.8 ANKYLOSING SPONDYLITIS OF SACRAL REGION: ICD-10-CM

## 2025-03-12 LAB
ALBUMIN SERPL BCP-MCNC: 3.8 G/DL (ref 3.5–5.2)
ALP SERPL-CCNC: 78 U/L (ref 40–150)
ALT SERPL W/O P-5'-P-CCNC: 18 U/L (ref 10–44)
ANION GAP SERPL CALC-SCNC: 5 MMOL/L (ref 8–16)
AST SERPL-CCNC: 20 U/L (ref 10–40)
BASOPHILS # BLD AUTO: 0.03 K/UL (ref 0–0.2)
BASOPHILS NFR BLD: 0.7 % (ref 0–1.9)
BILIRUB SERPL-MCNC: 0.4 MG/DL (ref 0.1–1)
BUN SERPL-MCNC: 6 MG/DL (ref 6–20)
CALCIUM SERPL-MCNC: 9 MG/DL (ref 8.7–10.5)
CHLORIDE SERPL-SCNC: 108 MMOL/L (ref 95–110)
CO2 SERPL-SCNC: 29 MMOL/L (ref 23–29)
CREAT SERPL-MCNC: 0.8 MG/DL (ref 0.5–1.4)
CRP SERPL-MCNC: 1.5 MG/L (ref 0–8.2)
DIFFERENTIAL METHOD BLD: ABNORMAL
EOSINOPHIL # BLD AUTO: 0.6 K/UL (ref 0–0.5)
EOSINOPHIL NFR BLD: 12.7 % (ref 0–8)
ERYTHROCYTE [DISTWIDTH] IN BLOOD BY AUTOMATED COUNT: 14 % (ref 11.5–14.5)
ERYTHROCYTE [SEDIMENTATION RATE] IN BLOOD BY PHOTOMETRIC METHOD: 4 MM/HR (ref 0–23)
EST. GFR  (NO RACE VARIABLE): >60 ML/MIN/1.73 M^2
GLUCOSE SERPL-MCNC: 73 MG/DL (ref 70–110)
HCT VFR BLD AUTO: 46.3 % (ref 40–54)
HGB BLD-MCNC: 14.7 G/DL (ref 14–18)
IMM GRANULOCYTES # BLD AUTO: 0.01 K/UL (ref 0–0.04)
IMM GRANULOCYTES NFR BLD AUTO: 0.2 % (ref 0–0.5)
LYMPHOCYTES # BLD AUTO: 2.3 K/UL (ref 1–4.8)
LYMPHOCYTES NFR BLD: 50.1 % (ref 18–48)
MCH RBC QN AUTO: 28.8 PG (ref 27–31)
MCHC RBC AUTO-ENTMCNC: 31.7 G/DL (ref 32–36)
MCV RBC AUTO: 91 FL (ref 82–98)
MONOCYTES # BLD AUTO: 0.6 K/UL (ref 0.3–1)
MONOCYTES NFR BLD: 13.4 % (ref 4–15)
NEUTROPHILS # BLD AUTO: 1 K/UL (ref 1.8–7.7)
NEUTROPHILS NFR BLD: 22.9 % (ref 38–73)
NRBC BLD-RTO: 0 /100 WBC
PLATELET # BLD AUTO: 312 K/UL (ref 150–450)
PMV BLD AUTO: 10.7 FL (ref 9.2–12.9)
POTASSIUM SERPL-SCNC: 4.1 MMOL/L (ref 3.5–5.1)
PROT SERPL-MCNC: 7.2 G/DL (ref 6–8.4)
RBC # BLD AUTO: 5.1 M/UL (ref 4.6–6.2)
SODIUM SERPL-SCNC: 142 MMOL/L (ref 136–145)
WBC # BLD AUTO: 4.49 K/UL (ref 3.9–12.7)

## 2025-03-12 PROCEDURE — 86140 C-REACTIVE PROTEIN: CPT

## 2025-03-12 PROCEDURE — 85652 RBC SED RATE AUTOMATED: CPT

## 2025-03-12 PROCEDURE — 80053 COMPREHEN METABOLIC PANEL: CPT

## 2025-03-12 PROCEDURE — 36415 COLL VENOUS BLD VENIPUNCTURE: CPT

## 2025-03-12 PROCEDURE — 85025 COMPLETE CBC W/AUTO DIFF WBC: CPT

## 2025-03-14 NOTE — PROGRESS NOTES
"Subjective:      Patient ID: Darshana Flor is a 39 y.o. male.    Chief Complaint: Follow up on ankylosing spondylitis     HPI  Darshana Flor is a 38 y.o. male with history of asthma diagnosed with sarcoidosis based on colonic biopsy with noncaseating granulomas.  Consultation for sarcoidosis from primary.  In 2018 had multiple episodes of bloody diarrhea.  Urgent care diagnosed hemorrhoids.  In 2020 evaluated by GI and had 2 colonoscopy.  Colonoscopy biopsy showed non caseating granulomas in the ileum and colon.  Lab showed positive Saccharomyces cerevisae IgG and IgM suggestive of Crohns.    Referred to pulmonary Dr. Shepherd who evaluated patient with x-ray, PFTs and CT chest.  No changes of sarcoid noted but pulmonary suspects CP.  Since 2017 has had a "knot" in his chest that causes intermittent CP worse with leaning back or breathing in.  Improves with steroid from primary.  Denies dysphagia or dynophagia.  Swallowing study normal.   The chronic chest pain felt to be musculoskeletal and not cardiac after cardiology work up.  Pain in muscles and joints shoulders, lower back, hands and hips.  Pain up to 10/10 ache intermittent.  Sudden stiffness in back occurs.   Awakens him from sleep.  Improves with steroids.   Morning stiffness for all day at times.   Pain awakens him from sleep.  Alternating buttock pain.  Exercise helps pain.   Not able to get in car due to pain.    Sudden onset lower extremity weakness that occurs every few month since 20s and he will fall.     +[id number]  Mother with RA, maternal uncle with AS.    Interval History:  Doing well but does not exercise like he should.   Sinus doing well since surgery.   Had 2 flare ups that improved with nasal spray that he was given.   Allergist Dr. Cris Lima at [facility] .       Now taking humira once every 6 weeks. Not interested in taking it more frequently.   He reports controlling his symptoms with diet and natural " herbs.  Suppresses his appetite, makes him nauseous when he takes it.  Doesn't like taking pharmaceuticals.     No increase in AS symptoms. Denies AM spine/joint stiffness/pain.    Review of Systems   Constitutional:  Negative for fever and unexpected weight change.   HENT:  Negative for mouth sores and trouble swallowing.    Eyes:  Negative for redness.   Respiratory:  Negative for cough and shortness of breath.    Cardiovascular:  Negative for chest pain.   Gastrointestinal:  Negative for constipation and diarrhea.   Genitourinary:  Negative for genital sores.   Skin:  Negative for rash.   Neurological:  Positive for headaches.   Hematological:  Does not bruise/bleed easily.        Objective:   BP (!) 133/90 (BP Location: Left arm, Patient Position: Sitting)   Pulse 68   Ht 6' (1.829 m)   Wt 97 kg (213 lb 13.5 oz)   BMI 29.00 kg/m²   Physical Exam   Constitutional: He is oriented to person, place, and time. normal appearance.   Cardiovascular: Normal rate and regular rhythm.   Pulmonary/Chest: Effort normal and breath sounds normal.   Musculoskeletal:         General: Normal range of motion.   Neurological: He is alert and oriented to person, place, and time.   Skin: Skin is warm and dry.   Psychiatric: His behavior is normal. Mood normal.            Assessment:     1. Ankylosing spondylitis of sacral region    2. Sarcoidosis of digestive system    3. Immunosuppression    4. Fatigue, unspecified type    5. Myalgia          Plan:     Problem List Items Addressed This Visit       Ankylosing spondylitis of sacral region    Relevant Medications    adalimumab (HUMIRA,CF, PEN) 40 mg/0.4 mL PnKt    Immunosuppression    Relevant Medications    adalimumab (HUMIRA,CF, PEN) 40 mg/0.4 mL PnKt    Fatigue    Relevant Medications    adalimumab (HUMIRA,CF, PEN) 40 mg/0.4 mL PnKt    Sarcoidosis of digestive system    Relevant Medications    adalimumab (HUMIRA,CF, PEN) 40 mg/0.4 mL PnKt     Other Visit Diagnoses         Myalgia         Relevant Medications    adalimumab (HUMIRA,CF, PEN) 40 mg/0.4 mL PnKt          AS: recent labs are stable, inflammation markers WNL. Encouraged to restart Humira every 2 weeks, rather than ever 4-6 weeks.   Continue Mediterranean diet. Limit carbs/sugar.  Restart exercises. He is not interested in PT at this time.    RTO 6mos with Dr. Ramirez

## 2025-03-17 ENCOUNTER — OFFICE VISIT (OUTPATIENT)
Dept: RHEUMATOLOGY | Facility: CLINIC | Age: 40
End: 2025-03-17
Payer: MEDICAID

## 2025-03-17 VITALS
HEIGHT: 72 IN | SYSTOLIC BLOOD PRESSURE: 133 MMHG | DIASTOLIC BLOOD PRESSURE: 90 MMHG | HEART RATE: 68 BPM | WEIGHT: 213.88 LBS | BODY MASS INDEX: 28.97 KG/M2

## 2025-03-17 DIAGNOSIS — R53.83 FATIGUE, UNSPECIFIED TYPE: ICD-10-CM

## 2025-03-17 DIAGNOSIS — M45.8 ANKYLOSING SPONDYLITIS OF SACRAL REGION: ICD-10-CM

## 2025-03-17 DIAGNOSIS — D86.89 SARCOIDOSIS OF DIGESTIVE SYSTEM: ICD-10-CM

## 2025-03-17 DIAGNOSIS — M79.10 MYALGIA: ICD-10-CM

## 2025-03-17 DIAGNOSIS — D84.9 IMMUNOSUPPRESSION: ICD-10-CM

## 2025-03-17 PROCEDURE — 99999 PR PBB SHADOW E&M-EST. PATIENT-LVL III: CPT | Mod: PBBFAC,,,

## 2025-03-17 PROCEDURE — 99213 OFFICE O/P EST LOW 20 MIN: CPT | Mod: PBBFAC

## 2025-03-17 RX ORDER — ADALIMUMAB 40MG/0.4ML
40 KIT SUBCUTANEOUS
Qty: 2 PEN | Refills: 0 | Status: SHIPPED | OUTPATIENT
Start: 2025-03-17 | End: 2025-03-17

## 2025-03-17 RX ORDER — ADALIMUMAB 40MG/0.4ML
40 KIT SUBCUTANEOUS
Qty: 2 PEN | Refills: 0 | Status: ACTIVE | OUTPATIENT
Start: 2025-03-17 | End: 2025-04-14

## 2025-03-17 NOTE — PROGRESS NOTES
3/16/2025     9:57 AM   Rapid3 Question Responses and Scores   MDHAQ Score 0.1   Psychologic Score 0   Pain Score 3.5   When you awakened in the morning OVER THE LAST WEEK, did you feel stiff? No   Fatigue Score 6   Global Health Score 0   RAPID3 Score 1.28        Answers submitted by the patient for this visit:  Rheumatology Questionnaire (Submitted on 3/16/2025)  fever: No  eye redness: No  mouth sores: No  headaches: Yes  shortness of breath: No  chest pain: No  trouble swallowing: No  diarrhea: No  constipation: No  unexpected weight change: No  genital sore: No  During the last 3 days, have you had a skin rash?: No  Bruises or bleeds easily: No  cough: No

## 2025-05-06 DIAGNOSIS — D86.89 SARCOIDOSIS OF DIGESTIVE SYSTEM: ICD-10-CM

## 2025-05-06 DIAGNOSIS — R53.83 FATIGUE, UNSPECIFIED TYPE: ICD-10-CM

## 2025-05-06 DIAGNOSIS — M79.10 MYALGIA: ICD-10-CM

## 2025-05-06 DIAGNOSIS — D84.9 IMMUNOSUPPRESSION: ICD-10-CM

## 2025-05-06 DIAGNOSIS — M45.8 ANKYLOSING SPONDYLITIS OF SACRAL REGION: ICD-10-CM

## 2025-05-06 RX ORDER — ADALIMUMAB 40MG/0.4ML
40 KIT SUBCUTANEOUS
Qty: 2 PEN | Refills: 0 | Status: CANCELLED | OUTPATIENT
Start: 2025-05-06 | End: 2025-06-03

## 2025-05-07 DIAGNOSIS — M79.10 MYALGIA: ICD-10-CM

## 2025-05-07 DIAGNOSIS — D86.89 SARCOIDOSIS OF DIGESTIVE SYSTEM: ICD-10-CM

## 2025-05-07 DIAGNOSIS — R53.83 FATIGUE, UNSPECIFIED TYPE: ICD-10-CM

## 2025-05-07 DIAGNOSIS — M45.8 ANKYLOSING SPONDYLITIS OF SACRAL REGION: ICD-10-CM

## 2025-05-07 DIAGNOSIS — D84.9 IMMUNOSUPPRESSION: ICD-10-CM

## 2025-05-08 RX ORDER — ADALIMUMAB 40MG/0.4ML
40 KIT SUBCUTANEOUS
Qty: 2 PEN | Refills: 2 | Status: ACTIVE | OUTPATIENT
Start: 2025-05-08 | End: 2025-07-31

## 2025-05-08 NOTE — TELEPHONE ENCOUNTER
Medication requested for Humira.  Last office visit on 3/17/25 and labs done on 3/12/25.  Order pended.